# Patient Record
Sex: MALE | Race: BLACK OR AFRICAN AMERICAN | NOT HISPANIC OR LATINO
[De-identification: names, ages, dates, MRNs, and addresses within clinical notes are randomized per-mention and may not be internally consistent; named-entity substitution may affect disease eponyms.]

---

## 2020-12-26 ENCOUNTER — TRANSCRIPTION ENCOUNTER (OUTPATIENT)
Age: 45
End: 2020-12-26

## 2022-01-01 ENCOUNTER — OUTPATIENT (OUTPATIENT)
Dept: OUTPATIENT SERVICES | Facility: HOSPITAL | Age: 47
LOS: 1 days | End: 2022-01-01
Payer: COMMERCIAL

## 2022-01-01 DIAGNOSIS — Z20.828 CONTACT WITH AND (SUSPECTED) EXPOSURE TO OTHER VIRAL COMMUNICABLE DISEASES: ICD-10-CM

## 2022-01-01 PROCEDURE — U0005: CPT

## 2022-01-01 PROCEDURE — U0003: CPT

## 2022-01-08 ENCOUNTER — OUTPATIENT (OUTPATIENT)
Dept: OUTPATIENT SERVICES | Facility: HOSPITAL | Age: 47
LOS: 1 days | End: 2022-01-08
Payer: COMMERCIAL

## 2022-01-08 DIAGNOSIS — Z20.828 CONTACT WITH AND (SUSPECTED) EXPOSURE TO OTHER VIRAL COMMUNICABLE DISEASES: ICD-10-CM
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
## 2022-01-08 LAB — SARS-COV-2 RNA SPEC QL NAA+PROBE: SIGNIFICANT CHANGE UP

## 2022-01-08 PROCEDURE — U0005: CPT

## 2022-01-08 PROCEDURE — U0003: CPT

## 2022-12-23 ENCOUNTER — INPATIENT (INPATIENT)
Facility: HOSPITAL | Age: 47
LOS: 1 days | Discharge: HOME | DRG: 392 | End: 2022-12-25
Attending: STUDENT IN AN ORGANIZED HEALTH CARE EDUCATION/TRAINING PROGRAM | Admitting: STUDENT IN AN ORGANIZED HEALTH CARE EDUCATION/TRAINING PROGRAM
Payer: COMMERCIAL

## 2022-12-23 VITALS
HEART RATE: 136 BPM | OXYGEN SATURATION: 100 % | SYSTOLIC BLOOD PRESSURE: 87 MMHG | WEIGHT: 218.04 LBS | DIASTOLIC BLOOD PRESSURE: 55 MMHG | TEMPERATURE: 98 F | RESPIRATION RATE: 18 BRPM | HEIGHT: 70 IN

## 2022-12-23 DIAGNOSIS — K92.2 GASTROINTESTINAL HEMORRHAGE, UNSPECIFIED: ICD-10-CM

## 2022-12-23 DIAGNOSIS — K92.1 MELENA: ICD-10-CM

## 2022-12-23 DIAGNOSIS — R17 UNSPECIFIED JAUNDICE: ICD-10-CM

## 2022-12-23 LAB
ABO RH CONFIRMATION: SIGNIFICANT CHANGE UP
ALBUMIN SERPL ELPH-MCNC: 3.4 G/DL — SIGNIFICANT CHANGE UP (ref 3.3–5)
ALP SERPL-CCNC: 32 U/L — LOW (ref 40–120)
ALT FLD-CCNC: 35 U/L — SIGNIFICANT CHANGE UP (ref 10–45)
ANION GAP SERPL CALC-SCNC: 8 MMOL/L — SIGNIFICANT CHANGE UP (ref 5–17)
APPEARANCE UR: CLEAR — SIGNIFICANT CHANGE UP
AST SERPL-CCNC: 16 U/L — SIGNIFICANT CHANGE UP (ref 10–40)
BASOPHILS # BLD AUTO: 0.05 K/UL — SIGNIFICANT CHANGE UP (ref 0–0.2)
BASOPHILS NFR BLD AUTO: 0.4 % — SIGNIFICANT CHANGE UP (ref 0–2)
BILIRUB SERPL-MCNC: 1.4 MG/DL — HIGH (ref 0.2–1.2)
BILIRUB UR-MCNC: NEGATIVE — SIGNIFICANT CHANGE UP
BLD GP AB SCN SERPL QL: SIGNIFICANT CHANGE UP
BUN SERPL-MCNC: 38 MG/DL — HIGH (ref 7–23)
CALCIUM SERPL-MCNC: 8.6 MG/DL — SIGNIFICANT CHANGE UP (ref 8.4–10.5)
CHLORIDE SERPL-SCNC: 104 MMOL/L — SIGNIFICANT CHANGE UP (ref 96–108)
CO2 SERPL-SCNC: 27 MMOL/L — SIGNIFICANT CHANGE UP (ref 22–31)
COLOR SPEC: YELLOW — SIGNIFICANT CHANGE UP
CREAT SERPL-MCNC: 1.09 MG/DL — SIGNIFICANT CHANGE UP (ref 0.5–1.3)
DIFF PNL FLD: NEGATIVE — SIGNIFICANT CHANGE UP
EGFR: 84 ML/MIN/1.73M2 — SIGNIFICANT CHANGE UP
EOSINOPHIL # BLD AUTO: 0.05 K/UL — SIGNIFICANT CHANGE UP (ref 0–0.5)
EOSINOPHIL NFR BLD AUTO: 0.4 % — SIGNIFICANT CHANGE UP (ref 0–6)
FLUAV AG NPH QL: SIGNIFICANT CHANGE UP
FLUBV AG NPH QL: SIGNIFICANT CHANGE UP
GLUCOSE BLDC GLUCOMTR-MCNC: 304 MG/DL — HIGH (ref 70–99)
GLUCOSE SERPL-MCNC: 337 MG/DL — HIGH (ref 70–99)
GLUCOSE UR QL: 1000 MG/DL
HCT VFR BLD CALC: 25.9 % — LOW (ref 39–50)
HCT VFR BLD CALC: 28.6 % — LOW (ref 39–50)
HGB BLD-MCNC: 8.6 G/DL — LOW (ref 13–17)
HGB BLD-MCNC: 9.6 G/DL — LOW (ref 13–17)
IMM GRANULOCYTES NFR BLD AUTO: 0.5 % — SIGNIFICANT CHANGE UP (ref 0–0.9)
KETONES UR-MCNC: ABNORMAL
LEUKOCYTE ESTERASE UR-ACNC: NEGATIVE — SIGNIFICANT CHANGE UP
LIDOCAIN IGE QN: 122 U/L — SIGNIFICANT CHANGE UP (ref 73–393)
LYMPHOCYTES # BLD AUTO: 2.73 K/UL — SIGNIFICANT CHANGE UP (ref 1–3.3)
LYMPHOCYTES # BLD AUTO: 21.3 % — SIGNIFICANT CHANGE UP (ref 13–44)
MCHC RBC-ENTMCNC: 28 PG — SIGNIFICANT CHANGE UP (ref 27–34)
MCHC RBC-ENTMCNC: 33.6 GM/DL — SIGNIFICANT CHANGE UP (ref 32–36)
MCV RBC AUTO: 83.4 FL — SIGNIFICANT CHANGE UP (ref 80–100)
MONOCYTES # BLD AUTO: 0.63 K/UL — SIGNIFICANT CHANGE UP (ref 0–0.9)
MONOCYTES NFR BLD AUTO: 4.9 % — SIGNIFICANT CHANGE UP (ref 2–14)
NEUTROPHILS # BLD AUTO: 9.29 K/UL — HIGH (ref 1.8–7.4)
NEUTROPHILS NFR BLD AUTO: 72.5 % — SIGNIFICANT CHANGE UP (ref 43–77)
NITRITE UR-MCNC: NEGATIVE — SIGNIFICANT CHANGE UP
NRBC # BLD: 0 /100 WBCS — SIGNIFICANT CHANGE UP (ref 0–0)
OB PNL STL: POSITIVE
PH UR: 5 — SIGNIFICANT CHANGE UP (ref 5–8)
PLATELET # BLD AUTO: 242 K/UL — SIGNIFICANT CHANGE UP (ref 150–400)
POTASSIUM SERPL-MCNC: 4.4 MMOL/L — SIGNIFICANT CHANGE UP (ref 3.5–5.3)
POTASSIUM SERPL-SCNC: 4.4 MMOL/L — SIGNIFICANT CHANGE UP (ref 3.5–5.3)
PROT SERPL-MCNC: 5.7 G/DL — LOW (ref 6–8.3)
PROT UR-MCNC: NEGATIVE — SIGNIFICANT CHANGE UP
RBC # BLD: 3.43 M/UL — LOW (ref 4.2–5.8)
RBC # FLD: 14 % — SIGNIFICANT CHANGE UP (ref 10.3–14.5)
RSV RNA NPH QL NAA+NON-PROBE: SIGNIFICANT CHANGE UP
SARS-COV-2 RNA SPEC QL NAA+PROBE: SIGNIFICANT CHANGE UP
SODIUM SERPL-SCNC: 139 MMOL/L — SIGNIFICANT CHANGE UP (ref 135–145)
SP GR SPEC: 1.01 — SIGNIFICANT CHANGE UP (ref 1.01–1.02)
UROBILINOGEN FLD QL: NEGATIVE — SIGNIFICANT CHANGE UP
WBC # BLD: 12.82 K/UL — HIGH (ref 3.8–10.5)
WBC # FLD AUTO: 12.82 K/UL — HIGH (ref 3.8–10.5)

## 2022-12-23 PROCEDURE — 99223 1ST HOSP IP/OBS HIGH 75: CPT

## 2022-12-23 PROCEDURE — 99223 1ST HOSP IP/OBS HIGH 75: CPT | Mod: 25

## 2022-12-23 PROCEDURE — 71045 X-RAY EXAM CHEST 1 VIEW: CPT | Mod: 26

## 2022-12-23 PROCEDURE — 76705 ECHO EXAM OF ABDOMEN: CPT | Mod: 26

## 2022-12-23 PROCEDURE — 99285 EMERGENCY DEPT VISIT HI MDM: CPT

## 2022-12-23 PROCEDURE — 93010 ELECTROCARDIOGRAM REPORT: CPT

## 2022-12-23 PROCEDURE — 43239 EGD BIOPSY SINGLE/MULTIPLE: CPT | Mod: 59

## 2022-12-23 RX ORDER — SODIUM CHLORIDE 9 MG/ML
1000 INJECTION INTRAMUSCULAR; INTRAVENOUS; SUBCUTANEOUS
Refills: 0 | Status: DISCONTINUED | OUTPATIENT
Start: 2022-12-23 | End: 2022-12-25

## 2022-12-23 RX ORDER — SODIUM CHLORIDE 9 MG/ML
1000 INJECTION INTRAMUSCULAR; INTRAVENOUS; SUBCUTANEOUS ONCE
Refills: 0 | Status: COMPLETED | OUTPATIENT
Start: 2022-12-23 | End: 2022-12-23

## 2022-12-23 RX ORDER — GLUCAGON INJECTION, SOLUTION 0.5 MG/.1ML
1 INJECTION, SOLUTION SUBCUTANEOUS ONCE
Refills: 0 | Status: DISCONTINUED | OUTPATIENT
Start: 2022-12-23 | End: 2022-12-25

## 2022-12-23 RX ORDER — DEXTROSE 50 % IN WATER 50 %
15 SYRINGE (ML) INTRAVENOUS ONCE
Refills: 0 | Status: DISCONTINUED | OUTPATIENT
Start: 2022-12-23 | End: 2022-12-25

## 2022-12-23 RX ORDER — ONDANSETRON 8 MG/1
4 TABLET, FILM COATED ORAL EVERY 8 HOURS
Refills: 0 | Status: DISCONTINUED | OUTPATIENT
Start: 2022-12-23 | End: 2022-12-25

## 2022-12-23 RX ORDER — CEFTRIAXONE 500 MG/1
1000 INJECTION, POWDER, FOR SOLUTION INTRAMUSCULAR; INTRAVENOUS ONCE
Refills: 0 | Status: COMPLETED | OUTPATIENT
Start: 2022-12-23 | End: 2022-12-23

## 2022-12-23 RX ORDER — PANTOPRAZOLE SODIUM 20 MG/1
40 TABLET, DELAYED RELEASE ORAL
Refills: 0 | Status: DISCONTINUED | OUTPATIENT
Start: 2022-12-24 | End: 2022-12-23

## 2022-12-23 RX ORDER — SODIUM CHLORIDE 9 MG/ML
1000 INJECTION, SOLUTION INTRAVENOUS
Refills: 0 | Status: DISCONTINUED | OUTPATIENT
Start: 2022-12-23 | End: 2022-12-25

## 2022-12-23 RX ORDER — DEXTROSE 50 % IN WATER 50 %
12.5 SYRINGE (ML) INTRAVENOUS ONCE
Refills: 0 | Status: DISCONTINUED | OUTPATIENT
Start: 2022-12-23 | End: 2022-12-25

## 2022-12-23 RX ORDER — PANTOPRAZOLE SODIUM 20 MG/1
40 TABLET, DELAYED RELEASE ORAL ONCE
Refills: 0 | Status: COMPLETED | OUTPATIENT
Start: 2022-12-23 | End: 2022-12-23

## 2022-12-23 RX ORDER — ONDANSETRON 8 MG/1
4 TABLET, FILM COATED ORAL ONCE
Refills: 0 | Status: COMPLETED | OUTPATIENT
Start: 2022-12-23 | End: 2022-12-23

## 2022-12-23 RX ORDER — LANOLIN ALCOHOL/MO/W.PET/CERES
3 CREAM (GRAM) TOPICAL AT BEDTIME
Refills: 0 | Status: DISCONTINUED | OUTPATIENT
Start: 2022-12-23 | End: 2022-12-25

## 2022-12-23 RX ORDER — DEXTROSE 50 % IN WATER 50 %
25 SYRINGE (ML) INTRAVENOUS ONCE
Refills: 0 | Status: DISCONTINUED | OUTPATIENT
Start: 2022-12-23 | End: 2022-12-25

## 2022-12-23 RX ORDER — INSULIN LISPRO 100/ML
VIAL (ML) SUBCUTANEOUS AT BEDTIME
Refills: 0 | Status: DISCONTINUED | OUTPATIENT
Start: 2022-12-23 | End: 2022-12-23

## 2022-12-23 RX ORDER — INSULIN LISPRO 100/ML
VIAL (ML) SUBCUTANEOUS EVERY 6 HOURS
Refills: 0 | Status: DISCONTINUED | OUTPATIENT
Start: 2022-12-23 | End: 2022-12-25

## 2022-12-23 RX ORDER — ACETAMINOPHEN 500 MG
650 TABLET ORAL EVERY 6 HOURS
Refills: 0 | Status: DISCONTINUED | OUTPATIENT
Start: 2022-12-23 | End: 2022-12-25

## 2022-12-23 RX ORDER — INSULIN LISPRO 100/ML
VIAL (ML) SUBCUTANEOUS
Refills: 0 | Status: DISCONTINUED | OUTPATIENT
Start: 2022-12-23 | End: 2022-12-25

## 2022-12-23 RX ORDER — PANTOPRAZOLE SODIUM 20 MG/1
40 TABLET, DELAYED RELEASE ORAL EVERY 12 HOURS
Refills: 0 | Status: DISCONTINUED | OUTPATIENT
Start: 2022-12-24 | End: 2022-12-25

## 2022-12-23 RX ADMIN — ONDANSETRON 4 MILLIGRAM(S): 8 TABLET, FILM COATED ORAL at 16:13

## 2022-12-23 RX ADMIN — CEFTRIAXONE 100 MILLIGRAM(S): 500 INJECTION, POWDER, FOR SOLUTION INTRAMUSCULAR; INTRAVENOUS at 14:54

## 2022-12-23 RX ADMIN — Medication 8: at 21:43

## 2022-12-23 RX ADMIN — PANTOPRAZOLE SODIUM 40 MILLIGRAM(S): 20 TABLET, DELAYED RELEASE ORAL at 14:54

## 2022-12-23 RX ADMIN — SODIUM CHLORIDE 1000 MILLILITER(S): 9 INJECTION INTRAMUSCULAR; INTRAVENOUS; SUBCUTANEOUS at 14:54

## 2022-12-23 RX ADMIN — CEFTRIAXONE 1000 MILLIGRAM(S): 500 INJECTION, POWDER, FOR SOLUTION INTRAMUSCULAR; INTRAVENOUS at 15:37

## 2022-12-23 NOTE — PATIENT PROFILE ADULT - FUNCTIONAL ASSESSMENT - BASIC MOBILITY 6.
4-calculated by average/Not able to assess (calculate score using [facility] averaging method)

## 2022-12-23 NOTE — ED PROVIDER NOTE - PHYSICAL EXAMINATION
General:     NAD, well-nourished, well-appearing  Eyes: PERRL, pale conjunctiva  Head:     NC/AT, EOMI, dry oral mucosa  Neck:     trachea midline  Lungs:     CTA b/l  CVS:     RRR  Abd:     +BS, s/nt/nd  Ext:   no deformities   Neuro: AAOx3

## 2022-12-23 NOTE — PATIENT PROFILE ADULT - FUNCTIONAL ASSESSMENT - BASIC MOBILITY 5.
4 = No assist / stand by assistance Principal Discharge DX:	Left shoulder pain  Secondary Diagnosis:	HTN (hypertension)

## 2022-12-23 NOTE — PROVIDER CONTACT NOTE (OTHER) - ACTION/TREATMENT ORDERED:
Will hold off on transfusion pt as per Jaxson SMITH. Pt is aware denies any pain or discomfort and in no distress, will continue to monitor.

## 2022-12-23 NOTE — PROGRESS NOTE ADULT - ASSESSMENT
PLAN:    -Hb 8.6 tonight, would repeat in AM or sooner if 1) melena again 2) vitals become unstable  -tranfuse if Hb <7  -NPO tonight  -BID protonix  -SCDs  -AM labs

## 2022-12-23 NOTE — CONSULT NOTE ADULT - SUBJECTIVE AND OBJECTIVE BOX
INTERVAL HPI/OVERNIGHT EVENTS:  HPI:  46 y/o male pmh DMT2, HLD who presents to ER with complaints of fatigue and dark stool. Patient noted fatigue starting on wednesday, found it difficult to go up the stairs. No nausea, vomiting, diarrhea, hematemesis, brbpr or coffee ground emesis. Denies fever/body aches or recent travel. Takes baby asa daily, no additional NSAIDs. No colonoscopy or upper endoscopy in the past. No unintentional weight loss or loss of appetite. Patient notes having physical done a few weeks ago with normal blood work.    MEDICATIONS  (STANDING):    MEDICATIONS  (PRN):      Allergies    No Known Allergies    Intolerances        PAST MEDICAL & SURGICAL HISTORY:  DM (diabetes mellitus)          REVIEW OF SYSTEMS: negative unless indicated in HPI    non smoker  social ETOH    PHYSICAL EXAM:   Vital Signs:  Vital Signs Last 24 Hrs  T(C): 36.4 (23 Dec 2022 14:34), Max: 36.4 (23 Dec 2022 14:34)  T(F): 97.5 (23 Dec 2022 14:34), Max: 97.5 (23 Dec 2022 14:34)  HR: 99 (23 Dec 2022 14:45) (99 - 136)  BP: 127/79 (23 Dec 2022 14:45) (87/55 - 127/79)  BP(mean): --  RR: 17 (23 Dec 2022 14:45) (17 - 18)  SpO2: 99% (23 Dec 2022 14:45) (99% - 100%)    Parameters below as of 23 Dec 2022 14:34  Patient On (Oxygen Delivery Method): room air      Daily Height in cm: 177.8 (23 Dec 2022 14:34)    Daily I&O's Summary      GENERAL:  Appears stated age  HEENT:  NC/AT,  conjunctivae clear and pink, sclera +icterus  CHEST:  Full & symmetric excursion, no increased effort, breath sounds clear  HEART:  Regular rhythm, S1, S2, no murmur  ABDOMEN:  Soft, non-tender, non-distended, normoactive bowel sounds  EXTEREMITIES:  no edema  SKIN:  No rash/warm/dry  NEURO:  Alert, oriented, no asterixis, no tremor, no encephalopathy      LABS:                        9.6    12.82 )-----------( 242      ( 23 Dec 2022 14:45 )             28.6     12-23    139  |  104  |  38<H>  ----------------------------<  337<H>  4.4   |  27  |  1.09    Ca    8.6      23 Dec 2022 14:45    TPro  5.7<L>  /  Alb  3.4  /  TBili  1.4<H>  /  DBili  x   /  AST  16  /  ALT  35  /  AlkPhos  32<L>  12-23        amylase   lipaseLipase, Serum: 122 U/L (12-23 @ 14:45)    RADIOLOGY & ADDITIONAL TESTS:

## 2022-12-23 NOTE — PATIENT PROFILE ADULT - NSPROHMDIABETMGMTSTRAT_GEN_A_NUR
activity/blood glucose testing/coping strategies/diet modification/exercise/medication therapy/weight management

## 2022-12-23 NOTE — H&P ADULT - HISTORY OF PRESENT ILLNESS
47 year old M PMH DM type 2 coming in for black stools for 1-2 days. Patient states Wednesday night he noted a black stool and felt dizzy but continued with evening, went to work the next day and had 1-2 more BM that were black and tarry. 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 Patient admits to dizziness and lightheadedness with ambulation and moving head in bed, but when laying still asymptomatic. Denies chest pain or sob at rest or with ambulation, denies having GI bleed in the past. 47 year old M PMH DM type 2 coming in for black stools for 1-2 days. Patient states Wednesday night he noted a black stool and felt dizzy but continued with evening, went to work the next day and had 1-2 more BM that were black and tarry. 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 Patient admits to dizziness and lightheadedness with ambulation and moving head in bed, but when laying still asymptomatic. Denies chest pain or sob at rest or with ambulation, denies having GI bleed in the past. Patient states he had blood work done outpatient a few weeks ago and was normal.    In the ED, T 97.5F< , BP 87/55, RR 18, SpO2 100% on RA. Labs showed WBC 12.82, H/H 9.6/28.6, FOBT positive, BUN/Cr 38/1.09, tbili 1.4, glucose 337.    EKG: PENDING  CXR: no acute pathology  US abdomen: No evidence for intrahepatic or extrahepatic biliary ductal dilatation. Hepatic steatosis. 47 year old M PMH DM type 2 coming in for black stools for 1-2 days. Patient states Wednesday night he noted a black stool and felt dizzy but continued with evening, went to work the next day and had 1-2 more BM that were black and tarry. 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 Patient admits to dizziness and lightheadedness with ambulation and moving head in bed, but when laying still asymptomatic. Denies chest pain or sob at rest or with ambulation, denies having GI bleed in the past. Patient states he had blood work done outpatient a few weeks ago and was normal. Patient also noted to have mildly yellow eyes, wife states that was not present before today.    In the ED, T 97.5F< , BP 87/55, RR 18, SpO2 100% on RA. Labs showed WBC 12.82, H/H 9.6/28.6, FOBT positive, BUN/Cr 38/1.09, tbili 1.4, glucose 337.    EKG: PENDING  CXR: no acute pathology  US abdomen: No evidence for intrahepatic or extrahepatic biliary ductal dilatation. Hepatic steatosis. 47 year old M PMH DM type 2 coming in for black stools for 1-2 days. Patient states Wednesday night he noted a black stool and felt dizzy but continued with evening, went to work the next day and had 1-2 more BM that were black and tarry. 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 Patient admits to dizziness and lightheadedness with ambulation and moving head in bed, but when laying still asymptomatic. Denies chest pain or sob at rest or with ambulation, denies having GI bleed in the past. Patient states he had blood work done outpatient a few weeks ago and was normal. Patient also noted to have mildly yellow eyes, wife states that was not present before today.    In the ED, T 97.5F< , BP 87/55, RR 18, SpO2 100% on RA. Labs showed WBC 12.82, H/H 9.6/28.6, FOBT positive, BUN/Cr 38/1.09, tbili 1.4, glucose 337. Received zofran 4mg IVP x1, rocephin x1, protonix 40mg IVP x2, NS bolus x1 in the ED.     EKG: PENDING  CXR: no acute pathology  US abdomen: No evidence for intrahepatic or extrahepatic biliary ductal dilatation. Hepatic steatosis.

## 2022-12-23 NOTE — PROVIDER CONTACT NOTE (OTHER) - SITUATION
Received pt from nurse with a hemoglobin at 9.6.Order for 1unit of BRBc AT 1612 in place not administer,Nurse was told to hold off. Blood work rechecked hemoglobin 8.6 PA made aware will hold off .

## 2022-12-23 NOTE — H&P ADULT - ASSESSMENT
47 year old M PMH DM type 2 coming in for black stools for 1-2 days admitted for GIB    #GIB  - admit to medicine  - likely upper GIB given symptoms  - patient is on asa 81mg daily for unknown reason, will stop and likely can hold on discharge  - Hb on admission 9.6 however symptomatic and discussed with GI, will give PRBC x1U today  - keep type and screen active  - consent form signed and in chart  - patient currently hemodynamically stable  - check H/H at 10pm and continue to monitor closely  - continue protonix 40mg IV BID  - GI consulted, Dr. Weaver- discussed with Tiara, patient to have EGD today. Will keep NPO and diet to be placed after by GI team    #elevated bilirubin  - tbili noted to be 1.4 on admission and patient with scleral icterus  - US abd with hepatic steatosis but no other pathology noted     #leukocytosis  - likely reactive   - patient does not appear toxic at this time, afebrile  - monitor off abx    #DM type 2  - on ozempic outpatient started about 6 months ago  - follow up HbA1C  - continue moderate dose ISS and check FS    #DVT ppx  - no chemical DVT ppx in setting of GI bleed  - encourage ambulation with assistance as patient dizzy    discussed with wife, Shari at bedside- all questions answered. 47 year old M PMH DM type 2 coming in for black stools for 1-2 days admitted for GIB    #GIB  - admit to medicine  - likely upper GIB given symptoms  - patient is on asa 81mg daily for unknown reason, will stop and likely can hold on discharge  - Hb on admission 9.6 however symptomatic and discussed with GI, will give PRBC x1U today  - keep type and screen active  - consent form signed and in chart  - patient currently hemodynamically stable  - check H/H at 10pm and continue to monitor closely  - continue protonix 40mg IV BID  - GI consulted, Dr. Weaver- discussed with Tiara, patient to have EGD today. Will keep NPO and diet to be placed after by GI team    #elevated bilirubin  - tbili noted to be 1.4 on admission and patient with scleral icterus  - US abd with hepatic steatosis but no other pathology noted   - follow up hepatic panel in the AM    #leukocytosis  - likely reactive   - patient does not appear toxic at this time, afebrile  - monitor off abx    #DM type 2  - on ozempic outpatient started about 6 months ago  - follow up HbA1C  - continue moderate dose ISS and check FS    #DVT ppx  - no chemical DVT ppx in setting of GI bleed  - encourage ambulation with assistance as patient dizzy    discussed with wife, Shari at bedside- all questions answered. 47 year old M PMH DM type 2 coming in for black stools for 1-2 days admitted for GIB    #GIB  - admit to medicine  - likely upper GIB given symptoms  - patient is on asa 81mg daily for unknown reason, will stop and likely can hold on discharge  - Hb on admission 9.6 however symptomatic and discussed with GI, will give PRBC x1U today  - keep type and screen active  - consent form signed and in chart  - patient currently hemodynamically stable  - check H/H at 10pm and continue to monitor closely  - continue protonix 40mg IV BID  - GI consulted, Dr. Weaver- discussed with Tiara, patient to have EGD today. Will keep NPO and diet to be placed after by GI team. Continue IVF    #elevated bilirubin  - tbili noted to be 1.4 on admission and patient with scleral icterus  - US abd with hepatic steatosis but no other pathology noted   - follow up hepatic panel in the AM    #leukocytosis  - likely reactive   - patient does not appear toxic at this time, afebrile  - monitor off abx    #DM type 2  - on ozempic outpatient started about 6 months ago  - follow up HbA1C  - continue moderate dose ISS and check FS    #DVT ppx  - no chemical DVT ppx in setting of GI bleed  - encourage ambulation with assistance as patient dizzy    discussed with wife, Shari at bedside- all questions answered. 47 year old M PMH DM type 2 coming in for black stools for 1-2 days admitted for GIB    #GIB  - admit to medicine  - likely upper GIB given symptoms  - patient is on asa 81mg daily for unknown reason, will stop and likely can hold on discharge  - Hb on admission 9.6 however symptomatic and discussed with GI, will give PRBC x1U today  - keep type and screen active  - consent form signed and in chart  - patient currently hemodynamically stable  - check H/H at 10pm and continue to monitor closely  - continue protonix 40mg IV BID  - GI consulted, Dr. Weaver- discussed with Tiara, patient to have EGD today. Will keep NPO and diet to be placed after by GI team. Continue IVF    addendum:  discussed with Dr. Weaver after EGD- not able to find anything specific, some food still in the stomach, no active bleeding. Will get CTA abd/pelvis to monitor for bleed. Keep NPO for now and IVF. Follow up in the AM    #elevated bilirubin  - tbili noted to be 1.4 on admission and patient with scleral icterus  - US abd with hepatic steatosis but no other pathology noted   - follow up hepatic panel in the AM    #leukocytosis  - likely reactive   - patient does not appear toxic at this time, afebrile  - monitor off abx    #DM type 2  - on ozempic outpatient started about 6 months ago  - follow up HbA1C  - continue moderate dose ISS and check FS    #DVT ppx  - no chemical DVT ppx in setting of GI bleed  - encourage ambulation with assistance as patient dizzy    discussed with wife, Shari at bedside- all questions answered.

## 2022-12-23 NOTE — PROGRESS NOTE ADULT - SUBJECTIVE AND OBJECTIVE BOX
F/U Note:    47y Male admitted with black/tarry stools, and Hb 9.6    Interval Hx:  per report had EGD today, however was inadequate as there was food in his stomach  -patient to have another attempt made tomorrow    Vital Signs Last 24 Hrs  T(C): 36.9 (23 Dec 2022 18:32), Max: 36.9 (23 Dec 2022 18:32)  T(F): 98.4 (23 Dec 2022 18:32), Max: 98.4 (23 Dec 2022 18:32)  HR: 91 (23 Dec 2022 18:32) (84 - 136)  BP: 124/63 (23 Dec 2022 18:32) (87/55 - 127/79)  BP(mean): 78 (23 Dec 2022 15:07) (78 - 78)  RR: 18 (23 Dec 2022 18:32) (16 - 18)  SpO2: 99% (23 Dec 2022 18:32) (99% - 100%)    Parameters below as of 23 Dec 2022 18:32  Patient On (Oxygen Delivery Method): room air                                8.6    x     )-----------( x        ( 23 Dec 2022 20:15 )             25.9         12-23    139  |  104  |  38<H>  ----------------------------<  337<H>  4.4   |  27  |  1.09    Ca    8.6      23 Dec 2022 14:45    TPro  5.7<L>  /  Alb  3.4  /  TBili  1.4<H>  /  DBili  x   /  AST  16  /  ALT  35  /  AlkPhos  32<L>  12-23        NEURO: no headaches, blurry vision, tremors, depression, anxity  CV: no chest pain, palpitations, murmurs, orthopnea  Resp: no shortness of breath, cough, wheeze, sputum production  GI: no stomach pain,nausea, vomitting, flatulence, hematemesis, hematochezia  PV: no swelling of extremities, no hair loss, no coolness to extremities  ENDO: no polydypsia, polyphagia, polyuria, weight loss, night sweats          NEURO: awake and alert  CV: (+) S1/S2, rrr, no mrg  RESP: CTA b/l  GI: soft, non tender

## 2022-12-23 NOTE — CONSULT NOTE ADULT - PROBLEM SELECTOR RECOMMENDATION 9
Monitor stool  Monitor h/h  Keep active type and screen  NPO  PPI IV BID  Upper endoscopy today  Continue IV fluids

## 2022-12-23 NOTE — ED PROVIDER NOTE - CLINICAL SUMMARY MEDICAL DECISION MAKING FREE TEXT BOX
pt 47y Male history of type 2 diabetes takes Ozempic for over 6 months now monthly.  Complaining of black stool since yesterday morning and dizziness x3 days to help.  Denies use of aspirin but takes lower dose aspirin daily last taken at 10:30 AM.  No history of gastritis or GI bleeds.  Denies diarrhea, fever, nausea, vomiting, chest pain, shortness of breath  pale conjunctiva. no distress. tachy and hypotensive but otherwise well appearing. will give ivf, protonix, and abx and wait for type and screen to transfuse as pt is stable

## 2022-12-23 NOTE — H&P ADULT - NSHPPHYSICALEXAM_GEN_ALL_CORE
T(C): 36.4 (12-23-22 @ 14:34), Max: 36.4 (12-23-22 @ 14:34)  HR: 84 (12-23-22 @ 16:08) (84 - 136)  BP: 104/64 (12-23-22 @ 16:08) (87/55 - 127/79)  RR: 16 (12-23-22 @ 16:08) (16 - 18)  SpO2: 100% (12-23-22 @ 16:08) (99% - 100%)    GENERAL: patient appears well, no acute distress, appropriate, pleasant  EYES: sclera icterus present but mild, no exudates  ENMT: oropharynx clear without erythema, no exudates, moist mucous membranes  NECK: supple, soft, no thyromegaly noted  LUNGS: good air entry bilaterally, clear to auscultation, symmetric breath sounds, no wheezing or rhonchi appreciated  HEART: soft S1/S2, regular rate and rhythm, no murmurs noted, no lower extremity edema  GASTROINTESTINAL: abdomen is soft, nontender, nondistended, normoactive bowel sounds, no palpable masses, negative nuñez sign  INTEGUMENT: good skin turgor, no lesions noted  MUSCULOSKELETAL: no clubbing or cyanosis, no obvious deformity  NEUROLOGIC: awake, alert, oriented x3, good muscle tone in 4 extremities, no obvious sensory deficits  PSYCHIATRIC: mood is good, affect is congruent, linear and logical thought process  HEME/LYMPH: no palpable supraclavicular nodules, no obvious ecchymosis or petechiae

## 2022-12-23 NOTE — ED PROVIDER NOTE - OBJECTIVE STATEMENT
pt 47y Male history of type 2 diabetes takes Ozempic for over 6 months now monthly.  Complaining of black stool since yesterday morning and dizziness x3 days to help.  Denies use of aspirin but takes lower dose aspirin daily last taken at 10:30 AM.  No history of gastritis or GI bleeds.  Denies diarrhea, fever, nausea, vomiting, chest pain, shortness of breath

## 2022-12-23 NOTE — CONSULT NOTE ADULT - ASSESSMENT
46 y/o male pmh DMT2, HLD who presents to ER with complaints of fatigue and dark stool. Patient noted fatigue starting on wednesday, found it difficult to go up the stairs. No nausea, vomiting, diarrhea, hematemesis, brbpr or coffee ground emesis. Denies fever/body aches or recent travel. Takes baby asa daily, no additional NSAIDs. No colonoscopy or upper endoscopy in the past. No unintentional weight loss or loss of appetite. Patient notes having physical done a few weeks ago with normal blood work.

## 2022-12-23 NOTE — ED PROVIDER NOTE - ATTENDING APP SHARED VISIT CONTRIBUTION OF CARE
I, Brendan Dias, DO personally saw the patient with JOSE.  I have personally performed a face to face diagnostic evaluation on this patient.  I have reviewed the JOSE note and agree with the history, exam, and plan of care, except as noted.  I personally saw the patient and performed a substantive portion of the visit including all aspects of the medical decision making.

## 2022-12-23 NOTE — ED ADULT NURSE REASSESSMENT NOTE - NS ED NURSE REASSESS COMMENT FT1
Blood bank called for Type and Cross 1 Unit per Dr. De La Torre. Consent obtained. RN witnessed consent.

## 2022-12-23 NOTE — ED ADULT NURSE NOTE - OBJECTIVE STATEMENT
47 yr old male to ED for complaints of dizziness. Patient states he has black stool and dizziness x 3 days. Patient denies chest pain, nausea, vomiting, shortness of breath.

## 2022-12-23 NOTE — H&P ADULT - NSHPSOCIALHISTORY_GEN_ALL_CORE
Lives at home with wife and 3 kids and dog  Denies illicit drug use, smoking  Occasional EtOH  Works as podiatrist

## 2022-12-23 NOTE — H&P ADULT - NSHPREVIEWOFSYSTEMS_GEN_ALL_CORE
CONSTITUTIONAL: denies fever, chills, fatigue, weakness  HEENT: denies blurred vision, sore throat  SKIN: denies new lesions, rash  CARDIOVASCULAR: denies chest pain, chest pressure, palpitations  RESPIRATORY: denies shortness of breath, sputum production  GASTROINTESTINAL: admits to black stools, denies nausea, vomiting, diarrhea, abdominal pain  GENITOURINARY: denies dysuria, discharge  NEUROLOGICAL: denies numbness, headache, focal weakness, admits to dizziness  MUSCULOSKELETAL: denies new joint pain, muscle aches  HEMATOLOGIC: denies gross bleeding, bruising  LYMPHATICS: denies enlarged lymph nodes, extremity swelling  PSYCHIATRIC: denies recent changes in anxiety, depression  ENDOCRINOLOGIC: denies sweating, cold or heat intolerance

## 2022-12-24 ENCOUNTER — TRANSCRIPTION ENCOUNTER (OUTPATIENT)
Age: 47
End: 2022-12-24

## 2022-12-24 LAB
A1C WITH ESTIMATED AVERAGE GLUCOSE RESULT: 8.9 % — HIGH (ref 4–5.6)
ALBUMIN SERPL ELPH-MCNC: 3.3 G/DL — SIGNIFICANT CHANGE UP (ref 3.3–5)
ALP SERPL-CCNC: 29 U/L — LOW (ref 40–120)
ALT FLD-CCNC: 34 U/L — SIGNIFICANT CHANGE UP (ref 10–45)
ANION GAP SERPL CALC-SCNC: 6 MMOL/L — SIGNIFICANT CHANGE UP (ref 5–17)
AST SERPL-CCNC: 17 U/L — SIGNIFICANT CHANGE UP (ref 10–40)
BASOPHILS # BLD AUTO: 0.03 K/UL — SIGNIFICANT CHANGE UP (ref 0–0.2)
BASOPHILS NFR BLD AUTO: 0.2 % — SIGNIFICANT CHANGE UP (ref 0–2)
BILIRUB DIRECT SERPL-MCNC: 0.2 MG/DL — SIGNIFICANT CHANGE UP (ref 0–0.3)
BILIRUB INDIRECT FLD-MCNC: 0.8 MG/DL — SIGNIFICANT CHANGE UP (ref 0.2–1)
BILIRUB SERPL-MCNC: 1 MG/DL — SIGNIFICANT CHANGE UP (ref 0.2–1.2)
BUN SERPL-MCNC: 32 MG/DL — HIGH (ref 7–23)
CALCIUM SERPL-MCNC: 8.2 MG/DL — LOW (ref 8.4–10.5)
CHLORIDE SERPL-SCNC: 108 MMOL/L — SIGNIFICANT CHANGE UP (ref 96–108)
CO2 SERPL-SCNC: 30 MMOL/L — SIGNIFICANT CHANGE UP (ref 22–31)
CREAT SERPL-MCNC: 0.98 MG/DL — SIGNIFICANT CHANGE UP (ref 0.5–1.3)
EGFR: 95 ML/MIN/1.73M2 — SIGNIFICANT CHANGE UP
EOSINOPHIL # BLD AUTO: 0 K/UL — SIGNIFICANT CHANGE UP (ref 0–0.5)
EOSINOPHIL NFR BLD AUTO: 0 % — SIGNIFICANT CHANGE UP (ref 0–6)
ESTIMATED AVERAGE GLUCOSE: 209 MG/DL — HIGH (ref 68–114)
GLUCOSE BLDC GLUCOMTR-MCNC: 158 MG/DL — HIGH (ref 70–99)
GLUCOSE BLDC GLUCOMTR-MCNC: 182 MG/DL — HIGH (ref 70–99)
GLUCOSE BLDC GLUCOMTR-MCNC: 186 MG/DL — HIGH (ref 70–99)
GLUCOSE BLDC GLUCOMTR-MCNC: 215 MG/DL — HIGH (ref 70–99)
GLUCOSE SERPL-MCNC: 239 MG/DL — HIGH (ref 70–99)
HCT VFR BLD CALC: 24.6 % — LOW (ref 39–50)
HCT VFR BLD CALC: 25.3 % — LOW (ref 39–50)
HCT VFR BLD CALC: 26 % — LOW (ref 39–50)
HGB BLD-MCNC: 8.1 G/DL — LOW (ref 13–17)
HGB BLD-MCNC: 8.4 G/DL — LOW (ref 13–17)
HGB BLD-MCNC: 8.8 G/DL — LOW (ref 13–17)
IMM GRANULOCYTES NFR BLD AUTO: 0.5 % — SIGNIFICANT CHANGE UP (ref 0–0.9)
LYMPHOCYTES # BLD AUTO: 1.7 K/UL — SIGNIFICANT CHANGE UP (ref 1–3.3)
LYMPHOCYTES # BLD AUTO: 13.8 % — SIGNIFICANT CHANGE UP (ref 13–44)
MCHC RBC-ENTMCNC: 27.8 PG — SIGNIFICANT CHANGE UP (ref 27–34)
MCHC RBC-ENTMCNC: 28.1 PG — SIGNIFICANT CHANGE UP (ref 27–34)
MCHC RBC-ENTMCNC: 28.3 PG — SIGNIFICANT CHANGE UP (ref 27–34)
MCHC RBC-ENTMCNC: 32.9 GM/DL — SIGNIFICANT CHANGE UP (ref 32–36)
MCHC RBC-ENTMCNC: 33.2 GM/DL — SIGNIFICANT CHANGE UP (ref 32–36)
MCHC RBC-ENTMCNC: 33.8 GM/DL — SIGNIFICANT CHANGE UP (ref 32–36)
MCV RBC AUTO: 83.1 FL — SIGNIFICANT CHANGE UP (ref 80–100)
MCV RBC AUTO: 83.8 FL — SIGNIFICANT CHANGE UP (ref 80–100)
MCV RBC AUTO: 86 FL — SIGNIFICANT CHANGE UP (ref 80–100)
MONOCYTES # BLD AUTO: 0.76 K/UL — SIGNIFICANT CHANGE UP (ref 0–0.9)
MONOCYTES NFR BLD AUTO: 6.1 % — SIGNIFICANT CHANGE UP (ref 2–14)
NEUTROPHILS # BLD AUTO: 9.81 K/UL — HIGH (ref 1.8–7.4)
NEUTROPHILS NFR BLD AUTO: 79.4 % — HIGH (ref 43–77)
NRBC # BLD: 0 /100 WBCS — SIGNIFICANT CHANGE UP (ref 0–0)
PLATELET # BLD AUTO: 197 K/UL — SIGNIFICANT CHANGE UP (ref 150–400)
PLATELET # BLD AUTO: 201 K/UL — SIGNIFICANT CHANGE UP (ref 150–400)
PLATELET # BLD AUTO: 209 K/UL — SIGNIFICANT CHANGE UP (ref 150–400)
POTASSIUM SERPL-MCNC: 4.2 MMOL/L — SIGNIFICANT CHANGE UP (ref 3.5–5.3)
POTASSIUM SERPL-SCNC: 4.2 MMOL/L — SIGNIFICANT CHANGE UP (ref 3.5–5.3)
PROT SERPL-MCNC: 5.6 G/DL — LOW (ref 6–8.3)
RBC # BLD: 2.86 M/UL — LOW (ref 4.2–5.8)
RBC # BLD: 3.02 M/UL — LOW (ref 4.2–5.8)
RBC # BLD: 3.13 M/UL — LOW (ref 4.2–5.8)
RBC # FLD: 14 % — SIGNIFICANT CHANGE UP (ref 10.3–14.5)
RBC # FLD: 14.2 % — SIGNIFICANT CHANGE UP (ref 10.3–14.5)
RBC # FLD: 14.3 % — SIGNIFICANT CHANGE UP (ref 10.3–14.5)
SODIUM SERPL-SCNC: 144 MMOL/L — SIGNIFICANT CHANGE UP (ref 135–145)
WBC # BLD: 12 K/UL — HIGH (ref 3.8–10.5)
WBC # BLD: 12.36 K/UL — HIGH (ref 3.8–10.5)
WBC # BLD: 13.17 K/UL — HIGH (ref 3.8–10.5)
WBC # FLD AUTO: 12 K/UL — HIGH (ref 3.8–10.5)
WBC # FLD AUTO: 12.36 K/UL — HIGH (ref 3.8–10.5)
WBC # FLD AUTO: 13.17 K/UL — HIGH (ref 3.8–10.5)

## 2022-12-24 PROCEDURE — 99233 SBSQ HOSP IP/OBS HIGH 50: CPT

## 2022-12-24 PROCEDURE — 74174 CTA ABD&PLVS W/CONTRAST: CPT | Mod: 26

## 2022-12-24 RX ORDER — SOD SULF/SODIUM/NAHCO3/KCL/PEG
1 SOLUTION, RECONSTITUTED, ORAL ORAL ONCE
Refills: 0 | Status: DISCONTINUED | OUTPATIENT
Start: 2022-12-24 | End: 2022-12-25

## 2022-12-24 RX ORDER — SENNA PLUS 8.6 MG/1
2 TABLET ORAL ONCE
Refills: 0 | Status: COMPLETED | OUTPATIENT
Start: 2022-12-24 | End: 2022-12-24

## 2022-12-24 RX ADMIN — Medication 2: at 17:34

## 2022-12-24 RX ADMIN — Medication 4: at 07:59

## 2022-12-24 RX ADMIN — SODIUM CHLORIDE 75 MILLILITER(S): 9 INJECTION INTRAMUSCULAR; INTRAVENOUS; SUBCUTANEOUS at 02:00

## 2022-12-24 RX ADMIN — PANTOPRAZOLE SODIUM 40 MILLIGRAM(S): 20 TABLET, DELAYED RELEASE ORAL at 17:35

## 2022-12-24 RX ADMIN — Medication 2: at 11:45

## 2022-12-24 RX ADMIN — PANTOPRAZOLE SODIUM 40 MILLIGRAM(S): 20 TABLET, DELAYED RELEASE ORAL at 06:38

## 2022-12-24 RX ADMIN — SENNA PLUS 2 TABLET(S): 8.6 TABLET ORAL at 21:14

## 2022-12-24 NOTE — DISCHARGE NOTE PROVIDER - NSDCMRMEDTOKEN_GEN_ALL_CORE_FT
Aspir 81 oral delayed release tablet: 1 tab(s) orally once a day  Ozempic (1 mg dose) 4 mg/3 mL subcutaneous solution: 0.75 milliliter(s) subcutaneous once a week on Thursday   Ozempic (1 mg dose) 4 mg/3 mL subcutaneous solution: 0.75 milliliter(s) subcutaneous once a week on Thursday  pantoprazole 40 mg intravenous injection: 40 milligram(s) intravenous every 12 hours   Ozempic (1 mg dose) 4 mg/3 mL subcutaneous solution: 0.75 milliliter(s) subcutaneous once a week on Thursday  Protonix 40 mg oral granule, delayed release: 1 each orally once a day

## 2022-12-24 NOTE — DISCHARGE NOTE PROVIDER - PROVIDER TOKENS
PROVIDER:[TOKEN:[[id number]],FOLLOWUP:[1 week]] PROVIDER:[TOKEN:[[id number]],FOLLOWUP:[1 week]],PROVIDER:[TOKEN:[[id number]]]

## 2022-12-24 NOTE — DISCHARGE NOTE PROVIDER - NSDCCPCAREPLAN_GEN_ALL_CORE_FT
PRINCIPAL DISCHARGE DIAGNOSIS  Diagnosis: Upper GI bleed  Assessment and Plan of Treatment: pt with guiac positive stool    Pt received 1 unit PRBCs for low H/H   Pt underwnt colonoscopy - poor clean out needs repeating   Pt to follow up with PMD for DM2 follow up  Follow up with Dr Meg Archer for further work up       PRINCIPAL DISCHARGE DIAGNOSIS  Diagnosis: Upper GI bleed  Assessment and Plan of Treatment: pt with guiac positive stool    Pt received 1 unit PRBCs for low H/H   Pt underwnt colonoscopy - poor clean out needs repeating   Pt to follow up with PMD for DM2 follow up. Please stop aspirin  Follow up with Dr Meg Archer for continued care

## 2022-12-24 NOTE — PROGRESS NOTE ADULT - SUBJECTIVE AND OBJECTIVE BOX
INTERVAL HPI/OVERNIGHT EVENTS:    Pt with no BM, no longer light headed.  S/P egd with apparent upper source, indeterminant for etiology.  HPI:  46 y/o male pmh DMT2, HLD who presents to ER with complaints of fatigue and dark stool. Patient noted fatigue starting on wednesday, found it difficult to go up the stairs. No nausea, vomiting, diarrhea, hematemesis, brbpr or coffee ground emesis. Denies fever/body aches or recent travel. Takes baby asa daily, no additional NSAIDs. No colonoscopy or upper endoscopy in the past. No unintentional weight loss or loss of appetite. Patient notes having physical done a few weeks ago with normal blood work.    MEDICATIONS  (STANDING):    MEDICATIONS  (PRN):      Allergies    No Known Allergies    Intolerances        PAST MEDICAL & SURGICAL HISTORY:  DM (diabetes mellitus)          REVIEW OF SYSTEMS: negative unless indicated in HPI    non smoker  social ETOH  ICU Vital Signs Last 24 Hrs  T(C): 36.7 (24 Dec 2022 13:00), Max: 37 (24 Dec 2022 09:00)  T(F): 98 (24 Dec 2022 13:00), Max: 98.6 (24 Dec 2022 09:00)  HR: 71 (24 Dec 2022 13:00) (68 - 136)  BP: 113/61 (24 Dec 2022 13:00) (87/55 - 139/70)  BP(mean): 78 (23 Dec 2022 15:07) (78 - 78)  ABP: --  ABP(mean): --  RR: 16 (24 Dec 2022 13:00) (16 - 18)  SpO2: 100% (24 Dec 2022 13:00) (98% - 100%)    O2 Parameters below as of 24 Dec 2022 13:00  Patient On (Oxygen Delivery Method): room air          PHYSICAL EXAM:   Vital Signs:  Vital Signs Last 24 Hrs  T(C): 36.4 (23 Dec 2022 14:34), Max: 36.4 (23 Dec 2022 14:34)  T(F): 97.5 (23 Dec 2022 14:34), Max: 97.5 (23 Dec 2022 14:34)  HR: 99 (23 Dec 2022 14:45) (99 - 136)  BP: 127/79 (23 Dec 2022 14:45) (87/55 - 127/79)  BP(mean): --  RR: 17 (23 Dec 2022 14:45) (17 - 18)  SpO2: 99% (23 Dec 2022 14:45) (99% - 100%)    Parameters below as of 23 Dec 2022 14:34  Patient On (Oxygen Delivery Method): room air      Daily Height in cm: 177.8 (23 Dec 2022 14:34)    Daily I&O's Summary      GENERAL:  Appears stated age  HEENT:  NC/AT,  conjunctivae clear and pink, sclera +icterus  CHEST:  Full & symmetric excursion, no increased effort, breath sounds clear  HEART:  Regular rhythm, S1, S2, no murmur  ABDOMEN:  Soft, non-tender, non-distended, normoactive bowel sounds  EXTEREMITIES:  no edema  SKIN:  No rash/warm/dry  NEURO:  Alert, oriented, no asterixis, no tremor, no encephalopathy      LABS:                                   8.1    12.36 )-----------( 197      ( 24 Dec 2022 06:45 )             24.6   12-24    144  |  108  |  32<H>  ----------------------------<  239<H>  4.2   |  30  |  0.98    Ca    8.2<L>      24 Dec 2022 06:45    TPro  5.6<L>  /  Alb  3.3  /  TBili  1.0  /  DBili  0.2  /  AST  17  /  ALT  34  /  AlkPhos  29<L>  12-24        RADIOLOGY & ADDITIONAL TESTS:    CT pending

## 2022-12-24 NOTE — PROGRESS NOTE ADULT - ASSESSMENT
47 year old M PMH DM type 2 coming in for black stools for 1-2 days admitted for GIB.    *Melena:  *Acute blood loss anemia:  *GIB:  Guiac positive   Hgb 8.1 this AM  1 iu PRBC this AM  Maintain active T+S  s/p EGD yesterday--still with retained food  PPI BID  GI following  Maintain 2 PIVs at all time   Advance diet to clears today    *Leukocytosis:  suspect reactive     *DM II:  HgbA1c 8.5  DIabetes educator appreciated  Monitor FS--cont ISS     DVT ppx: Venodynes     *Case d.w Dr Weaver  *Patient will update familuy    DIspo: pending clinical improvement    47 year old M PMH DM type 2 coming in for black stools for 1-2 days admitted for GIB.    *Melena:  *Acute blood loss anemia:  *GIB:  Guiac positive   Hgb 8.1 this AM  1 iu PRBC this AM  Maintain active T+S  s/p EGD yesterday--still with retained food--needs repeat  PPI BID  GI following  Maintain 2 PIVs at all time   Advance diet to clears today  CTAP with mass at GE junction--plan for EGD/EUS--outpatient vs inpatient depending on hemodynamic stability     *Leukocytosis:  suspect reactive     *DM II:  HgbA1c 8.5  DIabetes educator appreciated  Monitor FS--cont ISS     DVT ppx: Venodynes     *Case d.w Dr Weaver  *[facility]    DIspo: pending clinical improvement . Suspect home tomorrow if Hgb remains stable with close outpatient follow up

## 2022-12-24 NOTE — PROGRESS NOTE ADULT - ASSESSMENT
46 y/o male pmh DMT2, HLD who presents to ER with complaints of fatigue and dark stool. Patient noted fatigue starting on wednesday, found it difficult to go up the stairs. No nausea, vomiting, diarrhea, hematemesis, brbpr or coffee ground emesis. Denies fever/body aches or recent travel. Takes baby asa daily, no additional NSAIDs. No colonoscopy or upper endoscopy in the past. No unintentional weight loss or loss of appetite. Patient notes having physical done a few weeks ago with normal blood work.    Now stable post tz and egd demonstrating old blood but limited by food in stomach, therefore procedure terminated.    CT pending

## 2022-12-24 NOTE — DISCHARGE NOTE PROVIDER - CARE PROVIDERS DIRECT ADDRESSES
[email or web address]

## 2022-12-24 NOTE — PROGRESS NOTE ADULT - SUBJECTIVE AND OBJECTIVE BOX
Patient is a 47y old  Male who presents with a chief complaint of GIB (23 Dec 2022 19:37)  EGD yesterday--still with retained food in belly       Patient seen and examined at bedside.    ALLERGIES:  No Known Allergies    MEDICATIONS  (STANDING):  dextrose 5%. 1000 milliLiter(s) (100 mL/Hr) IV Continuous <Continuous>  dextrose 5%. 1000 milliLiter(s) (50 mL/Hr) IV Continuous <Continuous>  dextrose 50% Injectable 25 Gram(s) IV Push once  dextrose 50% Injectable 12.5 Gram(s) IV Push once  dextrose 50% Injectable 25 Gram(s) IV Push once  glucagon  Injectable 1 milliGRAM(s) IntraMuscular once  insulin lispro (ADMELOG) corrective regimen sliding scale   SubCutaneous every 6 hours  insulin lispro (ADMELOG) corrective regimen sliding scale   SubCutaneous three times a day before meals  pantoprazole  Injectable 40 milliGRAM(s) IV Push every 12 hours  sodium chloride 0.9%. 1000 milliLiter(s) (75 mL/Hr) IV Continuous <Continuous>    MEDICATIONS  (PRN):  acetaminophen     Tablet .. 650 milliGRAM(s) Oral every 6 hours PRN Temp greater or equal to 38C (100.4F), Mild Pain (1 - 3)  aluminum hydroxide/magnesium hydroxide/simethicone Suspension 30 milliLiter(s) Oral every 4 hours PRN Dyspepsia  dextrose Oral Gel 15 Gram(s) Oral once PRN Blood Glucose LESS THAN 70 milliGRAM(s)/deciliter  melatonin 3 milliGRAM(s) Oral at bedtime PRN Insomnia  ondansetron Injectable 4 milliGRAM(s) IV Push every 8 hours PRN Nausea and/or Vomiting    Vital Signs Last 24 Hrs  T(F): 98.6 (24 Dec 2022 09:00), Max: 98.6 (24 Dec 2022 09:00)  HR: 70 (24 Dec 2022 09:00) (68 - 136)  BP: 122/65 (24 Dec 2022 09:00) (87/55 - 127/79)  RR: 16 (24 Dec 2022 09:00) (16 - 18)  SpO2: 98% (24 Dec 2022 09:00) (98% - 100%)  I&O's Summary    23 Dec 2022 07:01  -  24 Dec 2022 07:00  --------------------------------------------------------  IN: 0 mL / OUT: 700 mL / NET: -700 mL      BMI (kg/m2): 33.2 (22 @ 18:32)  PHYSICAL EXAM:  General: NAD, A/O x 3  ENT: MMM, no thrush  Neck: Supple, No JVD  Lungs: Non labored breathing,  Clear to auscultation bilaterally,   Cardio: RRR, S1/S2, no pitting edema bilaterally  Abdomen: Soft, Nontender, Nondistended; Bowel sounds present  Extremities: No calf tenderness, moves all extremities    LABS:                        8.1    12.36 )-----------( 197      ( 24 Dec 2022 06:45 )             24.6           144  |  108  |  32  ----------------------------<  239  4.2   |  30  |  0.98    Ca    8.2      24 Dec 2022 06:45    TPro  5.6  /  Alb  3.3  /  TBili  1.0  /  DBili  0.2  /  AST  17  /  ALT  34  /  AlkPhos  29                                POCT Blood Glucose.: 215 mg/dL (24 Dec 2022 07:54)  POCT Blood Glucose.: 304 mg/dL (23 Dec 2022 21:39)      Urinalysis Basic - ( 23 Dec 2022 16:40 )    Color: Yellow / Appearance: Clear / S.015 / pH: x  Gluc: x / Ketone: Moderate  / Bili: Negative / Urobili: Negative   Blood: x / Protein: Negative / Nitrite: Negative   Leuk Esterase: Negative / RBC: x / WBC x   Sq Epi: x / Non Sq Epi: x / Bacteria: x            RADIOLOGY & ADDITIONAL TESTS:    Care Discussed with Consultants/Other Providers:    Patient is a 47y old  Male who presents with a chief complaint of GIB (23 Dec 2022 19:37)  EGD yesterday--still with retained food in belly       Patient seen and examined at bedside. Denies abdominal pain    ALLERGIES:  No Known Allergies    MEDICATIONS  (STANDING):  dextrose 5%. 1000 milliLiter(s) (100 mL/Hr) IV Continuous <Continuous>  dextrose 5%. 1000 milliLiter(s) (50 mL/Hr) IV Continuous <Continuous>  dextrose 50% Injectable 25 Gram(s) IV Push once  dextrose 50% Injectable 12.5 Gram(s) IV Push once  dextrose 50% Injectable 25 Gram(s) IV Push once  glucagon  Injectable 1 milliGRAM(s) IntraMuscular once  insulin lispro (ADMELOG) corrective regimen sliding scale   SubCutaneous every 6 hours  insulin lispro (ADMELOG) corrective regimen sliding scale   SubCutaneous three times a day before meals  pantoprazole  Injectable 40 milliGRAM(s) IV Push every 12 hours  sodium chloride 0.9%. 1000 milliLiter(s) (75 mL/Hr) IV Continuous <Continuous>    MEDICATIONS  (PRN):  acetaminophen     Tablet .. 650 milliGRAM(s) Oral every 6 hours PRN Temp greater or equal to 38C (100.4F), Mild Pain (1 - 3)  aluminum hydroxide/magnesium hydroxide/simethicone Suspension 30 milliLiter(s) Oral every 4 hours PRN Dyspepsia  dextrose Oral Gel 15 Gram(s) Oral once PRN Blood Glucose LESS THAN 70 milliGRAM(s)/deciliter  melatonin 3 milliGRAM(s) Oral at bedtime PRN Insomnia  ondansetron Injectable 4 milliGRAM(s) IV Push every 8 hours PRN Nausea and/or Vomiting    Vital Signs Last 24 Hrs  T(F): 98.6 (24 Dec 2022 09:00), Max: 98.6 (24 Dec 2022 09:00)  HR: 70 (24 Dec 2022 09:00) (68 - 136)  BP: 122/65 (24 Dec 2022 09:00) (87/55 - 127/79)  RR: 16 (24 Dec 2022 09:00) (16 - 18)  SpO2: 98% (24 Dec 2022 09:00) (98% - 100%)  I&O's Summary    23 Dec 2022 07:01  -  24 Dec 2022 07:00  --------------------------------------------------------  IN: 0 mL / OUT: 700 mL / NET: -700 mL      BMI (kg/m2): 33.2 (22 @ 18:32)  PHYSICAL EXAM:  General: NAD, A/O x 3  ENT: MMM, no thrush  Neck: Supple, No JVD  Lungs: Non labored breathing,  Clear to auscultation bilaterally,   Cardio: RRR, S1/S2, no pitting edema bilaterally  Abdomen: Soft, Nontender, Nondistended; Bowel sounds present  Extremities: No calf tenderness, moves all extremities    LABS:                        8.1    12.36 )-----------( 197      ( 24 Dec 2022 06:45 )             24.6           144  |  108  |  32  ----------------------------<  239  4.2   |  30  |  0.98    Ca    8.2      24 Dec 2022 06:45    TPro  5.6  /  Alb  3.3  /  TBili  1.0  /  DBili  0.2  /  AST  17  /  ALT  34  /  AlkPhos  29       POCT Blood Glucose.: 215 mg/dL (24 Dec 2022 07:54)  POCT Blood Glucose.: 304 mg/dL (23 Dec 2022 21:39)    Urinalysis Basic - ( 23 Dec 2022 16:40 )    Color: Yellow / Appearance: Clear / S.015 / pH: x  Gluc: x / Ketone: Moderate  / Bili: Negative / Urobili: Negative   Blood: x / Protein: Negative / Nitrite: Negative   Leuk Esterase: Negative / RBC: x / WBC x   Sq Epi: x / Non Sq Epi: x / Bacteria: x    RADIOLOGY & ADDITIONAL TESTS:    Care Discussed with Consultants/Other Providers: d/w Dr. Weaver

## 2022-12-24 NOTE — DISCHARGE NOTE PROVIDER - HOSPITAL COURSE
47 year old M PMH DM type 2 coming in for black stools for 1-2 days. Patient states Wednesday night he noted a black stool and felt dizzy but continued with evening, went to work the next day and had 1-2 more BM that were black and tarry. 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 Patient admits to dizziness and lightheadedness with ambulation and moving head in bed, but when laying still asymptomatic. Denies chest pain or sob at rest or with ambulation, denies having GI bleed in the past. Patient states he had blood work done outpatient a few weeks ago and was normal. Patient also noted to have mildly yellow eyes, wife states that was not present before day of admission,.    In the ED, T 97.5F< , BP 87/55, RR 18, SpO2 100% on RA. Labs showed WBC 12.82, H/H 9.6/28.6, FOBT positive, BUN/Cr 38/1.09, tbili 1.4, glucose 337. Received zofran 4mg IVP x1, rocephin x1, protonix 40mg IVP x2, NS bolus x1 in the ED.   CXR: no acute pathology.  US abdomen: No evidence for intrahepatic or extrahepatic biliary ductal dilatation. Hepatic steatosis. Admitted to hospitalist for further management . ASA stopped. Given 1 iu PRBC. PPI BID. GI consulted and performed EGD which revealed old blood and retained food in stomach.  CTAP revealed rounded density at GE junction measuring 3.5 cm, concerning for a gastric mass.   Hgb remained stable. Patient stable for dc with close outpatient follow up. He will need a repeat EGD and cscope      Discharging provider:        **RESULTS**  < from: CT Angio Abdomen and Pelvis w/ IV Cont (12.24.22 @ 13:44) >      IMPRESSION: No CT evidence for active extravasation of intravascular   contrast. There is a rounded density identified arising from the   posterior gastric wall adjacent to the GE junction measuring 3.5 cm   suspicious for gastric mass; further evaluation with upper endoscopy is   recommended.          --- End of Report ---    < end of copied text >       47 year old M PMH DM type 2 coming in for black stools for 1-2 days. Patient states Wednesday night he noted a black stool and felt dizzy but continued with evening, went to work the next day and had 1-2 more BM that were black and tarry. 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 Patient admits to dizziness and lightheadedness with ambulation and moving head in bed, but when laying still asymptomatic. Denies chest pain or sob at rest or with ambulation, denies having GI bleed in the past. Patient states he had blood work done outpatient a few weeks ago and was normal. Patient also noted to have mildly yellow eyes, wife states that was not present before day of admission,.    In the ED, T 97.5F< , BP 87/55, RR 18, SpO2 100% on RA. Labs showed WBC 12.82, H/H 9.6/28.6, FOBT positive, BUN/Cr 38/1.09, tbili 1.4, glucose 337. Received zofran 4mg IVP x1, rocephin x1, protonix 40mg IVP x2, NS bolus x1 in the ED.   CXR: no acute pathology.  US abdomen: No evidence for intrahepatic or extrahepatic biliary ductal dilatation. Hepatic steatosis. Admitted to hospitalist for further management . ASA stopped. Given 1 iu PRBC. PPI BID. GI consulted and performed EGD which revealed old blood and retained food in stomach.  CTAP revealed rounded density at GE junction measuring 3.5 cm, concerning for a gastric mass.   Hgb remained stable. Patient stable for dc with close outpatient follow up. He will need a repeat EGD and cscope.  Pt to follow up with Dr Weaver tuesday   and PMD for for DM - life style changes       Discharging provider:        **RESULTS**  < from: CT Angio Abdomen and Pelvis w/ IV Cont (12.24.22 @ 13:44) >      IMPRESSION: No CT evidence for active extravasation of intravascular   contrast. There is a rounded density identified arising from the   posterior gastric wall adjacent to the GE junction measuring 3.5 cm   suspicious for gastric mass; further evaluation with upper endoscopy is   recommended.          --- End of Report ---    < end of copied text >       47 year old M PMH DM type 2 coming in for black stools for 1-2 days. Patient states Wednesday night he noted a black stool and felt dizzy but continued with evening, went to work the next day and had 1-2 more BM that were black and tarry. 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 Patient admits to dizziness and lightheadedness with ambulation and moving head in bed, but when laying still asymptomatic. Denies chest pain or sob at rest or with ambulation, denies having GI bleed in the past. Patient states he had blood work done outpatient a few weeks ago and was normal. Patient also noted to have mildly yellow eyes, wife states that was not present before day of admission,.    In the ED, T 97.5F< , BP 87/55, RR 18, SpO2 100% on RA. Labs showed WBC 12.82, H/H 9.6/28.6, FOBT positive, BUN/Cr 38/1.09, tbili 1.4, glucose 337. Received zofran 4mg IVP x1, rocephin x1, protonix 40mg IVP x2, NS bolus x1 in the ED.   CXR: no acute pathology.  US abdomen: No evidence for intrahepatic or extrahepatic biliary ductal dilatation. Hepatic steatosis. Admitted to hospitalist for further management . ASA stopped. Given 1 iu PRBC. PPI BID. GI consulted and performed EGD which revealed old blood and retained food in stomach.  CTAP revealed rounded density at GE junction measuring 3.5 cm, concerning for a gastric mass.   Hgb remained stable. Patient stable for dc with close outpatient follow up. He will need a repeat EGD and cscope.  Pt to follow up with Dr Weaver tuesday   and PMD for for DM - life style changes       Discharging provider: Larry Cowan MD        **RESULTS**  < from: CT Angio Abdomen and Pelvis w/ IV Cont (12.24.22 @ 13:44) >      IMPRESSION: No CT evidence for active extravasation of intravascular   contrast. There is a rounded density identified arising from the   posterior gastric wall adjacent to the GE junction measuring 3.5 cm   suspicious for gastric mass; further evaluation with upper endoscopy is   recommended.          --- End of Report ---    < end of copied text >

## 2022-12-25 ENCOUNTER — TRANSCRIPTION ENCOUNTER (OUTPATIENT)
Age: 47
End: 2022-12-25

## 2022-12-25 VITALS
SYSTOLIC BLOOD PRESSURE: 125 MMHG | RESPIRATION RATE: 17 BRPM | DIASTOLIC BLOOD PRESSURE: 61 MMHG | HEART RATE: 65 BPM | TEMPERATURE: 98 F | OXYGEN SATURATION: 96 %

## 2022-12-25 LAB
ALBUMIN SERPL ELPH-MCNC: 3 G/DL — LOW (ref 3.3–5)
ALP SERPL-CCNC: 29 U/L — LOW (ref 40–120)
ALT FLD-CCNC: 27 U/L — SIGNIFICANT CHANGE UP (ref 10–45)
ANION GAP SERPL CALC-SCNC: 7 MMOL/L — SIGNIFICANT CHANGE UP (ref 5–17)
AST SERPL-CCNC: 13 U/L — SIGNIFICANT CHANGE UP (ref 10–40)
BILIRUB SERPL-MCNC: 1.2 MG/DL — SIGNIFICANT CHANGE UP (ref 0.2–1.2)
BUN SERPL-MCNC: 18 MG/DL — SIGNIFICANT CHANGE UP (ref 7–23)
CALCIUM SERPL-MCNC: 8 MG/DL — LOW (ref 8.4–10.5)
CHLORIDE SERPL-SCNC: 105 MMOL/L — SIGNIFICANT CHANGE UP (ref 96–108)
CO2 SERPL-SCNC: 27 MMOL/L — SIGNIFICANT CHANGE UP (ref 22–31)
CREAT SERPL-MCNC: 0.95 MG/DL — SIGNIFICANT CHANGE UP (ref 0.5–1.3)
CULTURE RESULTS: NO GROWTH — SIGNIFICANT CHANGE UP
EGFR: 99 ML/MIN/1.73M2 — SIGNIFICANT CHANGE UP
GLUCOSE BLDC GLUCOMTR-MCNC: 203 MG/DL — HIGH (ref 70–99)
GLUCOSE SERPL-MCNC: 169 MG/DL — HIGH (ref 70–99)
HCT VFR BLD CALC: 24.3 % — LOW (ref 39–50)
HGB BLD-MCNC: 8.2 G/DL — LOW (ref 13–17)
MCHC RBC-ENTMCNC: 28.6 PG — SIGNIFICANT CHANGE UP (ref 27–34)
MCHC RBC-ENTMCNC: 33.7 GM/DL — SIGNIFICANT CHANGE UP (ref 32–36)
MCV RBC AUTO: 84.7 FL — SIGNIFICANT CHANGE UP (ref 80–100)
NRBC # BLD: 0 /100 WBCS — SIGNIFICANT CHANGE UP (ref 0–0)
PLATELET # BLD AUTO: 195 K/UL — SIGNIFICANT CHANGE UP (ref 150–400)
POTASSIUM SERPL-MCNC: 4 MMOL/L — SIGNIFICANT CHANGE UP (ref 3.5–5.3)
POTASSIUM SERPL-SCNC: 4 MMOL/L — SIGNIFICANT CHANGE UP (ref 3.5–5.3)
PROT SERPL-MCNC: 5 G/DL — LOW (ref 6–8.3)
RBC # BLD: 2.87 M/UL — LOW (ref 4.2–5.8)
RBC # FLD: 14.2 % — SIGNIFICANT CHANGE UP (ref 10.3–14.5)
SARS-COV-2 RNA SPEC QL NAA+PROBE: SIGNIFICANT CHANGE UP
SODIUM SERPL-SCNC: 139 MMOL/L — SIGNIFICANT CHANGE UP (ref 135–145)
SPECIMEN SOURCE: SIGNIFICANT CHANGE UP
WBC # BLD: 9.14 K/UL — SIGNIFICANT CHANGE UP (ref 3.8–10.5)
WBC # FLD AUTO: 9.14 K/UL — SIGNIFICANT CHANGE UP (ref 3.8–10.5)

## 2022-12-25 PROCEDURE — 86850 RBC ANTIBODY SCREEN: CPT

## 2022-12-25 PROCEDURE — P9016: CPT

## 2022-12-25 PROCEDURE — 87086 URINE CULTURE/COLONY COUNT: CPT

## 2022-12-25 PROCEDURE — 82248 BILIRUBIN DIRECT: CPT

## 2022-12-25 PROCEDURE — 85027 COMPLETE CBC AUTOMATED: CPT

## 2022-12-25 PROCEDURE — 93005 ELECTROCARDIOGRAM TRACING: CPT

## 2022-12-25 PROCEDURE — 85025 COMPLETE CBC W/AUTO DIFF WBC: CPT

## 2022-12-25 PROCEDURE — 87637 SARSCOV2&INF A&B&RSV AMP PRB: CPT

## 2022-12-25 PROCEDURE — 83690 ASSAY OF LIPASE: CPT

## 2022-12-25 PROCEDURE — 96365 THER/PROPH/DIAG IV INF INIT: CPT

## 2022-12-25 PROCEDURE — 99239 HOSP IP/OBS DSCHRG MGMT >30: CPT

## 2022-12-25 PROCEDURE — 36430 TRANSFUSION BLD/BLD COMPNT: CPT

## 2022-12-25 PROCEDURE — 99233 SBSQ HOSP IP/OBS HIGH 50: CPT

## 2022-12-25 PROCEDURE — 86901 BLOOD TYPING SEROLOGIC RH(D): CPT

## 2022-12-25 PROCEDURE — 96375 TX/PRO/DX INJ NEW DRUG ADDON: CPT

## 2022-12-25 PROCEDURE — 74174 CTA ABD&PLVS W/CONTRAST: CPT

## 2022-12-25 PROCEDURE — 80053 COMPREHEN METABOLIC PANEL: CPT

## 2022-12-25 PROCEDURE — 76705 ECHO EXAM OF ABDOMEN: CPT

## 2022-12-25 PROCEDURE — 86923 COMPATIBILITY TEST ELECTRIC: CPT

## 2022-12-25 PROCEDURE — 99285 EMERGENCY DEPT VISIT HI MDM: CPT

## 2022-12-25 PROCEDURE — 86900 BLOOD TYPING SEROLOGIC ABO: CPT

## 2022-12-25 PROCEDURE — 82272 OCCULT BLD FECES 1-3 TESTS: CPT

## 2022-12-25 PROCEDURE — 36415 COLL VENOUS BLD VENIPUNCTURE: CPT

## 2022-12-25 PROCEDURE — 82962 GLUCOSE BLOOD TEST: CPT

## 2022-12-25 PROCEDURE — 87635 SARS-COV-2 COVID-19 AMP PRB: CPT

## 2022-12-25 PROCEDURE — 83036 HEMOGLOBIN GLYCOSYLATED A1C: CPT

## 2022-12-25 PROCEDURE — 85018 HEMOGLOBIN: CPT

## 2022-12-25 PROCEDURE — 71045 X-RAY EXAM CHEST 1 VIEW: CPT

## 2022-12-25 PROCEDURE — 85014 HEMATOCRIT: CPT

## 2022-12-25 RX ORDER — PANTOPRAZOLE SODIUM 20 MG/1
1 TABLET, DELAYED RELEASE ORAL
Qty: 30 | Refills: 0
Start: 2022-12-25 | End: 2023-01-23

## 2022-12-25 RX ORDER — PANTOPRAZOLE SODIUM 20 MG/1
40 TABLET, DELAYED RELEASE ORAL
Qty: 0 | Refills: 0 | DISCHARGE
Start: 2022-12-25

## 2022-12-25 RX ORDER — ASPIRIN/CALCIUM CARB/MAGNESIUM 324 MG
1 TABLET ORAL
Qty: 0 | Refills: 0 | DISCHARGE

## 2022-12-25 RX ORDER — PANTOPRAZOLE SODIUM 20 MG/1
40 TABLET, DELAYED RELEASE ORAL
Qty: 10 | Refills: 0
Start: 2022-12-25 | End: 2023-01-03

## 2022-12-25 RX ADMIN — Medication 4: at 08:55

## 2022-12-25 RX ADMIN — PANTOPRAZOLE SODIUM 40 MILLIGRAM(S): 20 TABLET, DELAYED RELEASE ORAL at 05:24

## 2022-12-25 RX ADMIN — Medication 650 MILLIGRAM(S): at 10:25

## 2022-12-25 RX ADMIN — Medication 650 MILLIGRAM(S): at 11:15

## 2022-12-25 NOTE — DISCHARGE NOTE NURSING/CASE MANAGEMENT/SOCIAL WORK - NSDCPEFALRISK_GEN_ALL_CORE
For information on Fall & Injury Prevention, visit: [email or web address]  [email or web address] OR  https://www.cdc.gov/steadi/patient.html

## 2022-12-25 NOTE — PROGRESS NOTE ADULT - SUBJECTIVE AND OBJECTIVE BOX
Patient is a 47y old  Male who presents with a chief complaint of GIB (24 Dec 2022 16:49)      Patient seen and examined at bedside.    ALLERGIES:  No Known Allergies    MEDICATIONS  (STANDING):  dextrose 5%. 1000 milliLiter(s) (100 mL/Hr) IV Continuous <Continuous>  dextrose 5%. 1000 milliLiter(s) (50 mL/Hr) IV Continuous <Continuous>  dextrose 50% Injectable 25 Gram(s) IV Push once  dextrose 50% Injectable 12.5 Gram(s) IV Push once  dextrose 50% Injectable 25 Gram(s) IV Push once  glucagon  Injectable 1 milliGRAM(s) IntraMuscular once  insulin lispro (ADMELOG) corrective regimen sliding scale   SubCutaneous every 6 hours  insulin lispro (ADMELOG) corrective regimen sliding scale   SubCutaneous three times a day before meals  pantoprazole  Injectable 40 milliGRAM(s) IV Push every 12 hours  polyethylene glycol/electrolyte Solution 1 milliLiter(s) Oral once  sodium chloride 0.9%. 1000 milliLiter(s) (75 mL/Hr) IV Continuous <Continuous>    MEDICATIONS  (PRN):  acetaminophen     Tablet .. 650 milliGRAM(s) Oral every 6 hours PRN Temp greater or equal to 38C (100.4F), Mild Pain (1 - 3)  aluminum hydroxide/magnesium hydroxide/simethicone Suspension 30 milliLiter(s) Oral every 4 hours PRN Dyspepsia  dextrose Oral Gel 15 Gram(s) Oral once PRN Blood Glucose LESS THAN 70 milliGRAM(s)/deciliter  melatonin 3 milliGRAM(s) Oral at bedtime PRN Insomnia  ondansetron Injectable 4 milliGRAM(s) IV Push every 8 hours PRN Nausea and/or Vomiting    Vital Signs Last 24 Hrs  T(F): 98.1 (25 Dec 2022 06:10), Max: 98.6 (24 Dec 2022 09:00)  HR: 60 (25 Dec 2022 06:10) (60 - 76)  BP: 109/57 (25 Dec 2022 06:10) (109/57 - 139/70)  RR: 18 (25 Dec 2022 06:10) (16 - 18)  SpO2: 98% (25 Dec 2022 06:10) (98% - 100%)  I&O's Summary    24 Dec 2022 07:01  -  25 Dec 2022 07:00  --------------------------------------------------------  IN: 240 mL / OUT: 600 mL / NET: -360 mL      PHYSICAL EXAM:  General: NAD, A/O x 3  ENT: MMM  Neck: Supple, No JVD  Lungs: Clear to auscultation bilaterally, Non labored breathing   Cardio: RRR, S1/S2, No murmurs  Abdomen: Soft, Nontender, Nondistended; Bowel sounds present  Extremities: No calf tenderness, No pitting edema    LABS:                        8.2    9.14  )-----------( 195      ( 25 Dec 2022 05:25 )             24.3         139  |  105  |  18  ----------------------------<  169  4.0   |  27  |  0.95    Ca    8.0      25 Dec 2022 05:25    TPro  5.0  /  Alb  3.0  /  TBili  1.2  /  DBili  x   /  AST  13  /  ALT  27  /  AlkPhos  29                              POCT Blood Glucose.: 182 mg/dL (24 Dec 2022 21:03)  POCT Blood Glucose.: 158 mg/dL (24 Dec 2022 17:32)  POCT Blood Glucose.: 186 mg/dL (24 Dec 2022 11:43)  POCT Blood Glucose.: 215 mg/dL (24 Dec 2022 07:54)      Urinalysis Basic - ( 23 Dec 2022 16:40 )    Color: Yellow / Appearance: Clear / S.015 / pH: x  Gluc: x / Ketone: Moderate  / Bili: Negative / Urobili: Negative   Blood: x / Protein: Negative / Nitrite: Negative   Leuk Esterase: Negative / RBC: x / WBC x   Sq Epi: x / Non Sq Epi: x / Bacteria: x          RADIOLOGY & ADDITIONAL TESTS:    Care Discussed with Consultants/Other Providers:    Patient is a 47y old  Male who presents with a chief complaint of GIB (24 Dec 2022 16:49)      Patient seen and examined at bedside.  Pt with c/o headache. Pt otherwise not having any symptoms of dizziness or lightheadness with ambulating or sitting .      ALLERGIES:  No Known Allergies    MEDICATIONS  (STANDING):  dextrose 5%. 1000 milliLiter(s) (100 mL/Hr) IV Continuous <Continuous>  dextrose 5%. 1000 milliLiter(s) (50 mL/Hr) IV Continuous <Continuous>  dextrose 50% Injectable 25 Gram(s) IV Push once  dextrose 50% Injectable 12.5 Gram(s) IV Push once  dextrose 50% Injectable 25 Gram(s) IV Push once  glucagon  Injectable 1 milliGRAM(s) IntraMuscular once  insulin lispro (ADMELOG) corrective regimen sliding scale   SubCutaneous every 6 hours  insulin lispro (ADMELOG) corrective regimen sliding scale   SubCutaneous three times a day before meals  pantoprazole  Injectable 40 milliGRAM(s) IV Push every 12 hours  polyethylene glycol/electrolyte Solution 1 milliLiter(s) Oral once  sodium chloride 0.9%. 1000 milliLiter(s) (75 mL/Hr) IV Continuous <Continuous>    MEDICATIONS  (PRN):  acetaminophen     Tablet .. 650 milliGRAM(s) Oral every 6 hours PRN Temp greater or equal to 38C (100.4F), Mild Pain (1 - 3)  aluminum hydroxide/magnesium hydroxide/simethicone Suspension 30 milliLiter(s) Oral every 4 hours PRN Dyspepsia  dextrose Oral Gel 15 Gram(s) Oral once PRN Blood Glucose LESS THAN 70 milliGRAM(s)/deciliter  melatonin 3 milliGRAM(s) Oral at bedtime PRN Insomnia  ondansetron Injectable 4 milliGRAM(s) IV Push every 8 hours PRN Nausea and/or Vomiting    Vital Signs Last 24 Hrs  T(F): 98.1 (25 Dec 2022 06:10), Max: 98.6 (24 Dec 2022 09:00)  HR: 60 (25 Dec 2022 06:10) (60 - 76)  BP: 109/57 (25 Dec 2022 06:10) (109/57 - 139/70)  RR: 18 (25 Dec 2022 06:10) (16 - 18)  SpO2: 98% (25 Dec 2022 06:10) (98% - 100%)  I&O's Summary    24 Dec 2022 07:01  -  25 Dec 2022 07:00  --------------------------------------------------------  IN: 240 mL / OUT: 600 mL / NET: -360 mL      PHYSICAL EXAM:  General: 48 y/o male in  NAD, A/O x 3  ENT: MMM  Neck: Supple, No JVD  Lungs: Clear to auscultation bilaterally, Non labored breathing   Cardio: RRR, S1/S2, No murmurs  Abdomen: Soft, Nontender, Nondistended; Bowel sounds present  Extremities: No calf tenderness, No pitting edema    LABS:                        8.2    9.14  )-----------( 195      ( 25 Dec 2022 05:25 )             24.3     12-25    139  |  105  |  18  ----------------------------<  169  4.0   |  27  |  0.95    Ca    8.0      25 Dec 2022 05:25    TPro  5.0  /  Alb  3.0  /  TBili  1.2  /  DBili  x   /  AST  13  /  ALT  27  /  AlkPhos  29                              POCT Blood Glucose.: 182 mg/dL (24 Dec 2022 21:03)  POCT Blood Glucose.: 158 mg/dL (24 Dec 2022 17:32)  POCT Blood Glucose.: 186 mg/dL (24 Dec 2022 11:43)  POCT Blood Glucose.: 215 mg/dL (24 Dec 2022 07:54)      Urinalysis Basic - ( 23 Dec 2022 16:40 )    Color: Yellow / Appearance: Clear / S.015 / pH: x  Gluc: x / Ketone: Moderate  / Bili: Negative / Urobili: Negative   Blood: x / Protein: Negative / Nitrite: Negative   Leuk Esterase: Negative / RBC: x / WBC x   Sq Epi: x / Non Sq Epi: x / Bacteria: x          RADIOLOGY & ADDITIONAL TESTS:    Care Discussed with Consultants/Other Providers:    Patient is a 47y old  Male who presents with a chief complaint of GIB (24 Dec 2022 16:49)    Patient seen and examined at bedside.  Pt with c/o headache. Pt otherwise not having any symptoms of dizziness or lightheadness with ambulating or sitting .      ALLERGIES:  No Known Allergies    MEDICATIONS  (STANDING):  dextrose 5%. 1000 milliLiter(s) (100 mL/Hr) IV Continuous <Continuous>  dextrose 5%. 1000 milliLiter(s) (50 mL/Hr) IV Continuous <Continuous>  dextrose 50% Injectable 25 Gram(s) IV Push once  dextrose 50% Injectable 12.5 Gram(s) IV Push once  dextrose 50% Injectable 25 Gram(s) IV Push once  glucagon  Injectable 1 milliGRAM(s) IntraMuscular once  insulin lispro (ADMELOG) corrective regimen sliding scale   SubCutaneous every 6 hours  insulin lispro (ADMELOG) corrective regimen sliding scale   SubCutaneous three times a day before meals  pantoprazole  Injectable 40 milliGRAM(s) IV Push every 12 hours  polyethylene glycol/electrolyte Solution 1 milliLiter(s) Oral once  sodium chloride 0.9%. 1000 milliLiter(s) (75 mL/Hr) IV Continuous <Continuous>    MEDICATIONS  (PRN):  acetaminophen     Tablet .. 650 milliGRAM(s) Oral every 6 hours PRN Temp greater or equal to 38C (100.4F), Mild Pain (1 - 3)  aluminum hydroxide/magnesium hydroxide/simethicone Suspension 30 milliLiter(s) Oral every 4 hours PRN Dyspepsia  dextrose Oral Gel 15 Gram(s) Oral once PRN Blood Glucose LESS THAN 70 milliGRAM(s)/deciliter  melatonin 3 milliGRAM(s) Oral at bedtime PRN Insomnia  ondansetron Injectable 4 milliGRAM(s) IV Push every 8 hours PRN Nausea and/or Vomiting    Vital Signs Last 24 Hrs  T(F): 98.1 (25 Dec 2022 06:10), Max: 98.6 (24 Dec 2022 09:00)  HR: 60 (25 Dec 2022 06:10) (60 - 76)  BP: 109/57 (25 Dec 2022 06:10) (109/57 - 139/70)  RR: 18 (25 Dec 2022 06:10) (16 - 18)  SpO2: 98% (25 Dec 2022 06:10) (98% - 100%)  I&O's Summary    24 Dec 2022 07:01  -  25 Dec 2022 07:00  --------------------------------------------------------  IN: 240 mL / OUT: 600 mL / NET: -360 mL      PHYSICAL EXAM:  General: 48 y/o male in  NAD, A/O x 3  ENT: MMM  Neck: Supple, No JVD  Lungs: Clear to auscultation bilaterally, Non labored breathing   Cardio: RRR, S1/S2, No murmurs  Abdomen: Soft, Nontender, Nondistended; Bowel sounds present  Extremities: No calf tenderness, No pitting edema    LABS:                        8.2    9.14  )-----------( 195      ( 25 Dec 2022 05:25 )             24.3     12-25    139  |  105  |  18  ----------------------------<  169  4.0   |  27  |  0.95    Ca    8.0      25 Dec 2022 05:25    TPro  5.0  /  Alb  3.0  /  TBili  1.2  /  DBili  x   /  AST  13  /  ALT  27  /  AlkPhos  29                              POCT Blood Glucose.: 182 mg/dL (24 Dec 2022 21:03)  POCT Blood Glucose.: 158 mg/dL (24 Dec 2022 17:32)  POCT Blood Glucose.: 186 mg/dL (24 Dec 2022 11:43)  POCT Blood Glucose.: 215 mg/dL (24 Dec 2022 07:54)      Urinalysis Basic - ( 23 Dec 2022 16:40 )    Color: Yellow / Appearance: Clear / S.015 / pH: x  Gluc: x / Ketone: Moderate  / Bili: Negative / Urobili: Negative   Blood: x / Protein: Negative / Nitrite: Negative   Leuk Esterase: Negative / RBC: x / WBC x   Sq Epi: x / Non Sq Epi: x / Bacteria: x          RADIOLOGY & ADDITIONAL TESTS:    Care Discussed with Consultants/Other Providers:

## 2022-12-25 NOTE — PROGRESS NOTE ADULT - ASSESSMENT
47 year old M PMH DM type 2 coming in for black stools for 1-2 days admitted for GIB.    *Melena:  *Acute blood loss anemia:  *GIB:  Guiac positive   Hgb 8.1 this AM  1 iu PRBC this AM  Maintain active T+S  s/p EGD yesterday--still with retained food--needs repeat  PPI BID  GI following  Maintain 2 PIVs at all time   Advance diet to clears today  CTAP with mass at GE junction--plan for EGD/EUS--outpatient vs inpatient depending on hemodynamic stability     CT angio of abd /pelvis  IMPRESSION: No CT evidence for active extravasation of intravascular   contrast. There is a rounded density identified arising from the   posterior gastric wall adjacent to the GE junction measuring 3.5 cm   suspicious for gastric mass; further evaluation with upper endoscopy is   recommended.      *Leukocytosis:  suspect reactive     *DM II:  HgbA1c 8.5  DIabetes educator appreciated  Monitor FS--cont ISS   POCT Blood Glucose.: 182 mg/dL (24 Dec 2022 21:03)  POCT Blood Glucose.: 158 mg/dL (24 Dec 2022 17:32)  POCT Blood Glucose.: 186 mg/dL (24 Dec 2022 11:43)  POCT Blood Glucose.: 215 mg/dL (24 Dec 2022 07:54)      DVT ppx: Venodynes     *Case d.w Dr Weaver  *Patient will update famiy    DIspo: pending clinical improvement . Suspect home tomorrow if Hgb remains stable with close outpatient follow up    47 year old M PMH DM type 2 coming in for black stools for 1-2 days admitted for GIB.    *Melena:  *Acute blood loss anemia:  *GIB:  Guiac positive   Hgb 8.2 this AM  Maintain active T+S  s/p EGD yesterday--still with retained food--needs repeat  PPI BID  GI following- Discussion this am with DR Weaver - pt ok for discharge and follow up in office Tuesday.  regular diet for now   CTAP with mass at GE junction--plan for EGD/EUS--outpatient  will follow up with GI on tuesday     CT angio of abd /pelvis  IMPRESSION: No CT evidence for active extravasation of intravascular   contrast. There is a rounded density identified arising from the   posterior gastric wall adjacent to the GE junction measuring 3.5 cm   suspicious for gastric mass; further evaluation with upper endoscopy is   recommended.      *Leukocytosis:  suspect reactive     *DM II:  HgbA1c 8.5  DIabetes educator appreciated  Monitor FS--cont ISS   POCT Blood Glucose.: 182 mg/dL (24 Dec 2022 21:03)  POCT Blood Glucose.: 158 mg/dL (24 Dec 2022 17:32)  POCT Blood Glucose.: 186 mg/dL (24 Dec 2022 11:43)  POCT Blood Glucose.: 215 mg/dL (24 Dec 2022 07:54)      DVT ppx: Venodynes     *Case d.w Dr Weaver  *Patient will update famiy    DIspo: anticipate discharge today    47 year old M PMH DM type 2 coming in for black stools for 1-2 days admitted for GIB.    *Melena:  *Acute blood loss anemia:  *GIB:  Guiac positive   Hgb 8.2 this AM  Maintain active T+S  s/p EGD yesterday--still with retained food--needs repeat  PPI BID  GI following- Discussion this am with DR Wevaer - pt ok for discharge and follow up in office Tuesday.  regular diet for now   CTAP with mass at GE junction--plan for EGD/EUS--outpatient  will follow up with GI on tuesday     CT angio of abd /pelvis  IMPRESSION: No CT evidence for active extravasation of intravascular   contrast. There is a rounded density identified arising from the   posterior gastric wall adjacent to the GE junction measuring 3.5 cm   suspicious for gastric mass; further evaluation with upper endoscopy is   recommended.      *Leukocytosis:  suspect reactive     *DM II:  HgbA1c 8.5  DIabetes educator appreciated  will start life style changes and follow up with PMD   Monitor FS--cont ISS   POCT Blood Glucose.: 182 mg/dL (24 Dec 2022 21:03)  POCT Blood Glucose.: 158 mg/dL (24 Dec 2022 17:32)  POCT Blood Glucose.: 186 mg/dL (24 Dec 2022 11:43)  POCT Blood Glucose.: 215 mg/dL (24 Dec 2022 07:54)      DVT ppx: Venodynes     *Case d.w Dr Weaver  *Patient will update famiy    DIspo: anticipate discharge today    47 year old M PMH DM type 2 coming in for black stools for 1-2 days admitted for GIB.    *Melena:  *Acute blood loss anemia:  *GIB:  Guiac positive   Hgb 8.2 this AM  Maintain active T+S  s/p EGD yesterday--still with retained food--needs repeat  PPI BID  GI following- Discussion this am with DR Weaver - pt ok for discharge and follow up in office Tuesday.  regular diet for now   CTAP with mass at GE junction--plan for EGD/EUS--outpatient  will follow up with GI on tuesday     CT angio of abd /pelvis  IMPRESSION: No CT evidence for active extravasation of intravascular   contrast. There is a rounded density identified arising from the   posterior gastric wall adjacent to the GE junction measuring 3.5 cm   suspicious for gastric mass; further evaluation with upper endoscopy is   recommended.    *Leukocytosis:  suspect reactive     *DM II:  HgbA1c 8.5  DIabetes educator appreciated  will start life style changes and follow up with PMD   Monitor FS--cont ISS   POCT Blood Glucose.: 182 mg/dL (24 Dec 2022 21:03)  POCT Blood Glucose.: 158 mg/dL (24 Dec 2022 17:32)  POCT Blood Glucose.: 186 mg/dL (24 Dec 2022 11:43)  POCT Blood Glucose.: 215 mg/dL (24 Dec 2022 07:54)      DVT ppx: Venodynes     *Case d.w Dr Weaver  *Patient will update famiy    DIspo: anticipate discharge today

## 2022-12-25 NOTE — PROGRESS NOTE ADULT - NS ATTEND AMEND GEN_ALL_CORE FT
d/w Dr. Weaver    EGD with retained food. Maintain on clears. GI amenable to outpatient scope (cannot be done until Tuesday unless emergent). Monitor H/H. Receiving 1u pRBC today. PPI
d/w Dr. Weaver  s/p 1u pRBC yesterday. Hg stable. Asymptomatic with ambulation.   Plan to DC today. Follow up Tuesday with GI

## 2022-12-25 NOTE — PROGRESS NOTE ADULT - ASSESSMENT
46 y/o male pmh DMT2, HLD who presents to ER with complaints of fatigue and dark stool. Patient noted fatigue starting on wednesday, found it difficult to go up the stairs. No nausea, vomiting, diarrhea, hematemesis, brbpr or coffee ground emesis. Denies fever/body aches or recent travel. Takes baby asa daily, no additional NSAIDs. No colonoscopy or upper endoscopy in the past. No unintentional weight loss or loss of appetite. Patient notes having physical done a few weeks ago with normal blood work.    Now stable post tz and egd demonstrating old blood but limited by food in stomach, therefore procedure terminated.    CT demonstrated gastric lesion

## 2022-12-25 NOTE — DISCHARGE NOTE NURSING/CASE MANAGEMENT/SOCIAL WORK - PATIENT PORTAL LINK FT
You can access the FollowMyHealth Patient Portal offered by [facility] by registering at the following website: [email or web address]. By joining [employer]’s FollowMyHealth portal, you will also be able to view your health information using other applications (apps) compatible with our system.

## 2022-12-25 NOTE — PROGRESS NOTE ADULT - SUBJECTIVE AND OBJECTIVE BOX
INTERVAL HPI/OVERNIGHT EVENTS:  No bleeding, tolerated clears... advanced diet this morning.    Pt with no BM, no longer light headed.  S/P egd with apparent upper source, indeterminant for etiology.  HPI:  48 y/o male pmh DMT2, HLD who presents to ER with complaints of fatigue and dark stool. Patient noted fatigue starting on wednesday, found it difficult to go up the stairs. No nausea, vomiting, diarrhea, hematemesis, brbpr or coffee ground emesis. Denies fever/body aches or recent travel. Takes baby asa daily, no additional NSAIDs. No colonoscopy or upper endoscopy in the past. No unintentional weight loss or loss of appetite. Patient notes having physical done a few weeks ago with normal blood work.    MEDICATIONS  (STANDING):    MEDICATIONS  (PRN):      Allergies    No Known Allergies    Intolerances        PAST MEDICAL & SURGICAL HISTORY:  DM (diabetes mellitus)          REVIEW OF SYSTEMS: negative unless indicated in HPI    non smoker  social ETOH  ICU Vital Signs Last 24 Hrs  T(C): 36.7 (24 Dec 2022 13:00), Max: 37 (24 Dec 2022 09:00)  T(F): 98 (24 Dec 2022 13:00), Max: 98.6 (24 Dec 2022 09:00)  HR: 71 (24 Dec 2022 13:00) (68 - 136)  BP: 113/61 (24 Dec 2022 13:00) (87/55 - 139/70)  BP(mean): 78 (23 Dec 2022 15:07) (78 - 78)  ABP: --  ABP(mean): --  RR: 16 (24 Dec 2022 13:00) (16 - 18)  SpO2: 100% (24 Dec 2022 13:00) (98% - 100%)    O2 Parameters below as of 24 Dec 2022 13:00  Patient On (Oxygen Delivery Method): room air          PHYSICAL EXAM:   Vital Signs:  Vital Signs Last 24 Hrs  T(C): 36.4 (23 Dec 2022 14:34), Max: 36.4 (23 Dec 2022 14:34)  T(F): 97.5 (23 Dec 2022 14:34), Max: 97.5 (23 Dec 2022 14:34)  HR: 99 (23 Dec 2022 14:45) (99 - 136)  BP: 127/79 (23 Dec 2022 14:45) (87/55 - 127/79)  BP(mean): --  RR: 17 (23 Dec 2022 14:45) (17 - 18)  SpO2: 99% (23 Dec 2022 14:45) (99% - 100%)    Parameters below as of 23 Dec 2022 14:34  Patient On (Oxygen Delivery Method): room air      Daily Height in cm: 177.8 (23 Dec 2022 14:34)    Daily I&O's Summary      GENERAL:  Appears stated age  HEENT:  NC/AT,  conjunctivae clear and pink, sclera +icterus  CHEST:  Full & symmetric excursion, no increased effort, breath sounds clear  HEART:  Regular rhythm, S1, S2, no murmur  ABDOMEN:  Soft, non-tender, non-distended, normoactive bowel sounds  EXTEREMITIES:  no edema  SKIN:  No rash/warm/dry  NEURO:  Alert, oriented, no asterixis, no tremor, no encephalopathy      LABS:                                   8.1    12.36 )-----------( 197      ( 24 Dec 2022 06:45 )             24.6   12-24    144  |  108  |  32<H>  ----------------------------<  239<H>  4.2   |  30  |  0.98    Ca    8.2<L>      24 Dec 2022 06:45    TPro  5.6<L>  /  Alb  3.3  /  TBili  1.0  /  DBili  0.2  /  AST  17  /  ALT  34  /  AlkPhos  29<L>  12-24        RADIOLOGY & ADDITIONAL TESTS:    CT pending

## 2022-12-25 NOTE — PROGRESS NOTE ADULT - PROBLEM SELECTOR PLAN 1
Upper source likely related to gastric lesion seen on CT scan.    Attempting to arrange outpt egd/eus and then likely resection.    Clinically seems to  have stopped bleeding on PPI.    Pt to call my office Tuesday am.
Likely upper source of unclear etiology.    If CT negative would advance diet.  If stable swab pt for covid early am and d/c for outpt enteroscopy and  colonoscopy on Tuesday    if rebleeds, emergent egd

## 2022-12-27 DIAGNOSIS — K92.2 GASTROINTESTINAL HEMORRHAGE, UNSPECIFIED: ICD-10-CM

## 2022-12-27 PROBLEM — Z00.00 ENCOUNTER FOR PREVENTIVE HEALTH EXAMINATION: Status: ACTIVE | Noted: 2022-12-27

## 2022-12-27 PROBLEM — E11.9 TYPE 2 DIABETES MELLITUS WITHOUT COMPLICATIONS: Chronic | Status: ACTIVE | Noted: 2022-12-23

## 2022-12-29 ENCOUNTER — TRANSCRIPTION ENCOUNTER (OUTPATIENT)
Age: 47
End: 2022-12-29

## 2022-12-29 ENCOUNTER — RESULT REVIEW (OUTPATIENT)
Age: 47
End: 2022-12-29

## 2022-12-29 ENCOUNTER — OUTPATIENT (OUTPATIENT)
Dept: OUTPATIENT SERVICES | Facility: HOSPITAL | Age: 47
LOS: 1 days | End: 2022-12-29
Payer: COMMERCIAL

## 2022-12-29 ENCOUNTER — APPOINTMENT (OUTPATIENT)
Dept: GASTROENTEROLOGY | Facility: HOSPITAL | Age: 47
End: 2022-12-29

## 2022-12-29 VITALS
RESPIRATION RATE: 23 BRPM | OXYGEN SATURATION: 100 % | HEART RATE: 67 BPM | WEIGHT: 218.04 LBS | SYSTOLIC BLOOD PRESSURE: 130 MMHG | DIASTOLIC BLOOD PRESSURE: 68 MMHG | HEIGHT: 70 IN | TEMPERATURE: 97 F

## 2022-12-29 VITALS — HEART RATE: 71 BPM | OXYGEN SATURATION: 99 % | SYSTOLIC BLOOD PRESSURE: 117 MMHG | RESPIRATION RATE: 20 BRPM

## 2022-12-29 DIAGNOSIS — K92.2 GASTROINTESTINAL HEMORRHAGE, UNSPECIFIED: ICD-10-CM

## 2022-12-29 DIAGNOSIS — K59.00 CONSTIPATION, UNSPECIFIED: ICD-10-CM

## 2022-12-29 DIAGNOSIS — Z98.890 OTHER SPECIFIED POSTPROCEDURAL STATES: Chronic | ICD-10-CM

## 2022-12-29 LAB — GLUCOSE BLDC GLUCOMTR-MCNC: 225 MG/DL — HIGH (ref 70–99)

## 2022-12-29 PROCEDURE — 88305 TISSUE EXAM BY PATHOLOGIST: CPT

## 2022-12-29 PROCEDURE — 88341 IMHCHEM/IMCYTCHM EA ADD ANTB: CPT

## 2022-12-29 PROCEDURE — 88173 CYTOPATH EVAL FNA REPORT: CPT | Mod: 26

## 2022-12-29 PROCEDURE — 43242 EGD US FINE NEEDLE BX/ASPIR: CPT

## 2022-12-29 PROCEDURE — 82962 GLUCOSE BLOOD TEST: CPT

## 2022-12-29 PROCEDURE — 88173 CYTOPATH EVAL FNA REPORT: CPT

## 2022-12-29 PROCEDURE — 43238 EGD US FINE NEEDLE BX/ASPIR: CPT | Mod: GC

## 2022-12-29 PROCEDURE — 88342 IMHCHEM/IMCYTCHM 1ST ANTB: CPT | Mod: 26

## 2022-12-29 PROCEDURE — 88341 IMHCHEM/IMCYTCHM EA ADD ANTB: CPT | Mod: 26

## 2022-12-29 PROCEDURE — 88305 TISSUE EXAM BY PATHOLOGIST: CPT | Mod: 26

## 2022-12-29 PROCEDURE — 88342 IMHCHEM/IMCYTCHM 1ST ANTB: CPT

## 2022-12-29 RX ORDER — SODIUM CHLORIDE 9 MG/ML
500 INJECTION INTRAMUSCULAR; INTRAVENOUS; SUBCUTANEOUS
Refills: 0 | Status: DISCONTINUED | OUTPATIENT
Start: 2022-12-29 | End: 2023-01-12

## 2022-12-29 RX ORDER — PANTOPRAZOLE 40 MG/1
40 TABLET, DELAYED RELEASE ORAL
Qty: 30 | Refills: 3 | Status: ACTIVE | COMMUNITY
Start: 2022-12-29 | End: 1900-01-01

## 2022-12-29 NOTE — ASU PATIENT PROFILE, ADULT - NSICDXPASTMEDICALHX_GEN_ALL_CORE_FT
PAST MEDICAL HISTORY:  DM (diabetes mellitus)      PAST MEDICAL HISTORY:  DM (diabetes mellitus)     GI bleed     High cholesterol

## 2022-12-29 NOTE — PRE PROCEDURE NOTE - PRE PROCEDURE EVALUATION
Attending Physician: Dr Addison                      Procedure: EGD/EUS/FNB    Indication for Procedure: gastric mass  ________________________________________________________  PAST MEDICAL & SURGICAL HISTORY:  DM (diabetes mellitus)      No significant past surgical history        ALLERGIES:  No Known Allergies    HOME MEDICATIONS:  Ozempic (1 mg dose) 4 mg/3 mL subcutaneous solution: 0.75 milliliter(s) subcutaneous once a week on Thursday    AICD/PPM: [ ] yes   [x ] no    PERTINENT LAB DATA:                      PHYSICAL EXAMINATION:    T(C): --  HR: --  BP: --  RR: --  SpO2: --    Constitutional: NAD  HEENT: PERRLA, EOMI,    Neck:  No JVD  Respiratory: CTAB/L  Cardiovascular: S1 and S2  Gastrointestinal: BS+, soft, NT/ND  Extremities: No peripheral edema  Neurological: A/O x 3, no focal deficits  Psychiatric: Normal mood, normal affect  Skin: No rashes    ASA Class: I [ x]  II [ ]  III [ ]  IV [ ]    COMMENTS:    The patient is a suitable candidate for the planned procedure unless box checked [ ]  No, explain:

## 2022-12-29 NOTE — ASU PATIENT PROFILE, ADULT - NSICDXPASTSURGICALHX_GEN_ALL_CORE_FT
PAST SURGICAL HISTORY:  No significant past surgical history      PAST SURGICAL HISTORY:  History of esophagogastroduodenoscopy (EGD)

## 2023-01-05 PROBLEM — K92.2 GASTROINTESTINAL HEMORRHAGE, UNSPECIFIED: Chronic | Status: ACTIVE | Noted: 2022-12-29

## 2023-01-05 PROBLEM — Z86.39 HISTORY OF TYPE 2 DIABETES MELLITUS: Status: RESOLVED | Noted: 2023-01-05 | Resolved: 2023-01-05

## 2023-01-05 PROBLEM — E78.00 PURE HYPERCHOLESTEROLEMIA, UNSPECIFIED: Chronic | Status: ACTIVE | Noted: 2022-12-29

## 2023-01-05 PROBLEM — Z86.39 HISTORY OF HYPERLIPIDEMIA: Status: RESOLVED | Noted: 2023-01-05 | Resolved: 2023-01-05

## 2023-01-05 PROBLEM — Z01.818 PREOP EXAMINATION: Status: ACTIVE | Noted: 2023-01-05

## 2023-01-05 LAB — NON-GYNECOLOGICAL CYTOLOGY STUDY: SIGNIFICANT CHANGE UP

## 2023-01-06 ENCOUNTER — APPOINTMENT (OUTPATIENT)
Dept: THORACIC SURGERY | Facility: CLINIC | Age: 48
End: 2023-01-06
Payer: COMMERCIAL

## 2023-01-06 VITALS
DIASTOLIC BLOOD PRESSURE: 77 MMHG | TEMPERATURE: 97.2 F | OXYGEN SATURATION: 95 % | RESPIRATION RATE: 18 BRPM | SYSTOLIC BLOOD PRESSURE: 137 MMHG | HEART RATE: 71 BPM

## 2023-01-06 VITALS — HEIGHT: 70 IN | BODY MASS INDEX: 31.21 KG/M2 | WEIGHT: 218.04 LBS

## 2023-01-06 DIAGNOSIS — Z92.89 PERSONAL HISTORY OF OTHER MEDICAL TREATMENT: ICD-10-CM

## 2023-01-06 DIAGNOSIS — Z86.39 PERSONAL HISTORY OF OTHER ENDOCRINE, NUTRITIONAL AND METABOLIC DISEASE: ICD-10-CM

## 2023-01-06 DIAGNOSIS — Z01.818 ENCOUNTER FOR OTHER PREPROCEDURAL EXAMINATION: ICD-10-CM

## 2023-01-06 PROCEDURE — 99244 OFF/OP CNSLTJ NEW/EST MOD 40: CPT

## 2023-01-06 RX ORDER — POLYETHYLENE GLYCOL 3350 17 G/17G
17 POWDER, FOR SOLUTION ORAL DAILY
Qty: 30 | Refills: 3 | Status: COMPLETED | COMMUNITY
Start: 2022-12-29 | End: 2023-01-06

## 2023-01-06 RX ORDER — METFORMIN HYDROCHLORIDE 500 MG/1
500 TABLET, COATED ORAL
Refills: 0 | Status: COMPLETED | COMMUNITY
End: 2023-01-06

## 2023-01-06 RX ORDER — SEMAGLUTIDE 1.34 MG/ML
2 INJECTION, SOLUTION SUBCUTANEOUS
Refills: 0 | Status: ACTIVE | COMMUNITY

## 2023-01-06 RX ORDER — ATORVASTATIN CALCIUM 20 MG/1
20 TABLET, FILM COATED ORAL
Qty: 30 | Refills: 3 | Status: ACTIVE | COMMUNITY

## 2023-01-22 ENCOUNTER — APPOINTMENT (OUTPATIENT)
Dept: NUCLEAR MEDICINE | Facility: CLINIC | Age: 48
End: 2023-01-22
Payer: COMMERCIAL

## 2023-01-22 ENCOUNTER — OUTPATIENT (OUTPATIENT)
Dept: OUTPATIENT SERVICES | Facility: HOSPITAL | Age: 48
LOS: 1 days | End: 2023-01-22
Payer: COMMERCIAL

## 2023-01-22 DIAGNOSIS — Z98.890 OTHER SPECIFIED POSTPROCEDURAL STATES: Chronic | ICD-10-CM

## 2023-01-22 DIAGNOSIS — Z01.818 ENCOUNTER FOR OTHER PREPROCEDURAL EXAMINATION: ICD-10-CM

## 2023-01-22 PROCEDURE — 78815 PET IMAGE W/CT SKULL-THIGH: CPT | Mod: 26,PI

## 2023-01-22 PROCEDURE — A9552: CPT

## 2023-01-22 PROCEDURE — 78815 PET IMAGE W/CT SKULL-THIGH: CPT

## 2023-01-30 ENCOUNTER — OUTPATIENT (OUTPATIENT)
Dept: OUTPATIENT SERVICES | Facility: HOSPITAL | Age: 48
LOS: 1 days | End: 2023-01-30
Payer: COMMERCIAL

## 2023-01-30 VITALS
RESPIRATION RATE: 16 BRPM | DIASTOLIC BLOOD PRESSURE: 76 MMHG | HEIGHT: 71.26 IN | WEIGHT: 220.46 LBS | OXYGEN SATURATION: 99 % | SYSTOLIC BLOOD PRESSURE: 127 MMHG | HEART RATE: 66 BPM | TEMPERATURE: 98 F

## 2023-01-30 DIAGNOSIS — C49.A0 GASTROINTESTINAL STROMAL TUMOR, UNSPECIFIED SITE: ICD-10-CM

## 2023-01-30 DIAGNOSIS — Z98.890 OTHER SPECIFIED POSTPROCEDURAL STATES: Chronic | ICD-10-CM

## 2023-01-30 DIAGNOSIS — E11.9 TYPE 2 DIABETES MELLITUS WITHOUT COMPLICATIONS: ICD-10-CM

## 2023-01-30 LAB
A1C WITH ESTIMATED AVERAGE GLUCOSE RESULT: 7.1 % — HIGH (ref 4–5.6)
ANION GAP SERPL CALC-SCNC: 14 MMOL/L — SIGNIFICANT CHANGE UP (ref 7–14)
BLD GP AB SCN SERPL QL: NEGATIVE — SIGNIFICANT CHANGE UP
BUN SERPL-MCNC: 15 MG/DL — SIGNIFICANT CHANGE UP (ref 7–23)
CALCIUM SERPL-MCNC: 9.4 MG/DL — SIGNIFICANT CHANGE UP (ref 8.4–10.5)
CHLORIDE SERPL-SCNC: 99 MMOL/L — SIGNIFICANT CHANGE UP (ref 98–107)
CO2 SERPL-SCNC: 23 MMOL/L — SIGNIFICANT CHANGE UP (ref 22–31)
CREAT SERPL-MCNC: 0.86 MG/DL — SIGNIFICANT CHANGE UP (ref 0.5–1.3)
EGFR: 107 ML/MIN/1.73M2 — SIGNIFICANT CHANGE UP
ESTIMATED AVERAGE GLUCOSE: 157 — SIGNIFICANT CHANGE UP
GLUCOSE SERPL-MCNC: 240 MG/DL — HIGH (ref 70–99)
HCT VFR BLD CALC: 45.2 % — SIGNIFICANT CHANGE UP (ref 39–50)
HGB BLD-MCNC: 14.4 G/DL — SIGNIFICANT CHANGE UP (ref 13–17)
MCHC RBC-ENTMCNC: 27.8 PG — SIGNIFICANT CHANGE UP (ref 27–34)
MCHC RBC-ENTMCNC: 31.9 GM/DL — LOW (ref 32–36)
MCV RBC AUTO: 87.3 FL — SIGNIFICANT CHANGE UP (ref 80–100)
NRBC # BLD: 0 /100 WBCS — SIGNIFICANT CHANGE UP (ref 0–0)
NRBC # FLD: 0 K/UL — SIGNIFICANT CHANGE UP (ref 0–0)
PLATELET # BLD AUTO: 356 K/UL — SIGNIFICANT CHANGE UP (ref 150–400)
POTASSIUM SERPL-MCNC: 3.7 MMOL/L — SIGNIFICANT CHANGE UP (ref 3.5–5.3)
POTASSIUM SERPL-SCNC: 3.7 MMOL/L — SIGNIFICANT CHANGE UP (ref 3.5–5.3)
RBC # BLD: 5.18 M/UL — SIGNIFICANT CHANGE UP (ref 4.2–5.8)
RBC # FLD: 14.6 % — HIGH (ref 10.3–14.5)
RH IG SCN BLD-IMP: NEGATIVE — SIGNIFICANT CHANGE UP
SODIUM SERPL-SCNC: 136 MMOL/L — SIGNIFICANT CHANGE UP (ref 135–145)
WBC # BLD: 5.73 K/UL — SIGNIFICANT CHANGE UP (ref 3.8–10.5)
WBC # FLD AUTO: 5.73 K/UL — SIGNIFICANT CHANGE UP (ref 3.8–10.5)

## 2023-01-30 PROCEDURE — 93010 ELECTROCARDIOGRAM REPORT: CPT

## 2023-01-30 RX ORDER — ASPIRIN/CALCIUM CARB/MAGNESIUM 324 MG
81 TABLET ORAL
Qty: 0 | Refills: 0 | DISCHARGE

## 2023-01-30 RX ORDER — SODIUM CHLORIDE 9 MG/ML
3 INJECTION INTRAMUSCULAR; INTRAVENOUS; SUBCUTANEOUS EVERY 8 HOURS
Refills: 0 | Status: DISCONTINUED | OUTPATIENT
Start: 2023-02-08 | End: 2023-02-10

## 2023-01-30 RX ORDER — FERROUS SULFATE 325(65) MG
0 TABLET ORAL
Qty: 0 | Refills: 0 | DISCHARGE

## 2023-01-30 RX ORDER — FERROUS SULFATE 325(65) MG
1 TABLET ORAL
Qty: 0 | Refills: 0 | DISCHARGE

## 2023-01-30 NOTE — H&P PST ADULT - NSICDXFAMILYHX_GEN_ALL_CORE_FT
FAMILY HISTORY:  Mother  Still living? Unknown  Family history of diabetes mellitus (DM), Age at diagnosis: Age Unknown  FH: stroke, Age at diagnosis: Age Unknown    Grandparent  Still living? Unknown  Family history of diabetes mellitus (DM), Age at diagnosis: Age Unknown  FH: stroke, Age at diagnosis: Age Unknown

## 2023-01-30 NOTE — H&P PST ADULT - PROBLEM SELECTOR PLAN 1
Pre-op instructions provided. Pt verbalized understanding.   Pt to take own medication for GI ppx.   Pt given detailed verbal and written instructions on chlorhexidine wash. Pt verbalized understanding with teachback.   Order placed for preop COVID PCR testing. Pre-op instructions provided. Pt verbalized understanding.   Pt to take own medication for GI ppx.   Pt given detailed verbal and written instructions on chlorhexidine wash. Pt verbalized understanding with teachback.   Order placed for preop COVID PCR testing.  Pt with recent blood transfusion - states he will return within 72 hours of surgery for repeat T&S.

## 2023-01-30 NOTE — H&P PST ADULT - WEIGHT IN LBS
Patient Information     Patient Name MRN Praveen Tuttle [id number] Male 1944      Nursing Note by Christina Meehan at 1/15/2018  9:45 AM     Author:  Christina Meehan Service:  (none) Author Type:  (none)     Filed:  1/15/2018 10:07 AM Encounter Date:  1/15/2018 Status:  Signed     :  Christina Meehan            Here for 6 month follow up on PSA.  Review of Systems:    Weight loss:    No     Recent fever/chills:  No   Night sweats:   No  Current skin rash:  No   Recent hair loss:  No  Heat intolerance:  No   Cold intolerance:  No  Chest pain:   No   Palpitations:   No  Shortness of breath:  No   Wheezing:   No  Constipation:    No   Diarrhea:   No   Nausea:   No   Vomiting:   No   Kidney/side pain:  No   Back pain:   No  Frequent headaches:  No   Dizziness:     No  Leg swelling:   No   Calf pain:    No    Christina Meehan LPN  1/15/2018  9:57 AM                  
220.4

## 2023-01-30 NOTE — H&P PST ADULT - HISTORY OF PRESENT ILLNESS
47 year old male with recent hospitalization in 12/2022 for upper GI bleed, s/p transfusion 1 units PRBC. 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 Pt presents today for presurgical evaluation  47 year old male with recent hospitalization in 12/2022 for upper GI bleed, s/p transfusion 1 units PRBC. 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 Pt presents today for presurgical evaluation for Robotic Esophagogastroduodenoscopy, Laparoscopic Resection of Gastrointestinal Stromal Tumor (GIST), Possible Esophagogastrectomy.

## 2023-02-06 ENCOUNTER — OUTPATIENT (OUTPATIENT)
Dept: OUTPATIENT SERVICES | Facility: HOSPITAL | Age: 48
LOS: 1 days | End: 2023-02-06

## 2023-02-06 DIAGNOSIS — C49.A0 GASTROINTESTINAL STROMAL TUMOR, UNSPECIFIED SITE: ICD-10-CM

## 2023-02-06 DIAGNOSIS — Z98.890 OTHER SPECIFIED POSTPROCEDURAL STATES: Chronic | ICD-10-CM

## 2023-02-07 ENCOUNTER — TRANSCRIPTION ENCOUNTER (OUTPATIENT)
Age: 48
End: 2023-02-07

## 2023-02-07 NOTE — ASU PATIENT PROFILE, ADULT - FALL HARM RISK - UNIVERSAL INTERVENTIONS
Bed in lowest position, wheels locked, appropriate side rails in place/Call bell, personal items and telephone in reach/Instruct patient to call for assistance before getting out of bed or chair/Non-slip footwear when patient is out of bed/[place] to call system/Physically safe environment - no spills, clutter or unnecessary equipment/Purposeful Proactive Rounding/Room/bathroom lighting operational, light cord in reach

## 2023-02-08 ENCOUNTER — INPATIENT (INPATIENT)
Facility: HOSPITAL | Age: 48
LOS: 1 days | Discharge: HOME | End: 2023-02-10
Attending: THORACIC SURGERY (CARDIOTHORACIC VASCULAR SURGERY) | Admitting: THORACIC SURGERY (CARDIOTHORACIC VASCULAR SURGERY)
Payer: COMMERCIAL

## 2023-02-08 ENCOUNTER — RESULT REVIEW (OUTPATIENT)
Age: 48
End: 2023-02-08

## 2023-02-08 ENCOUNTER — TRANSCRIPTION ENCOUNTER (OUTPATIENT)
Age: 48
End: 2023-02-08

## 2023-02-08 ENCOUNTER — APPOINTMENT (OUTPATIENT)
Dept: THORACIC SURGERY | Facility: HOSPITAL | Age: 48
End: 2023-02-08

## 2023-02-08 VITALS
HEIGHT: 70 IN | TEMPERATURE: 98 F | DIASTOLIC BLOOD PRESSURE: 71 MMHG | WEIGHT: 220.46 LBS | SYSTOLIC BLOOD PRESSURE: 120 MMHG | OXYGEN SATURATION: 97 % | RESPIRATION RATE: 16 BRPM | HEART RATE: 60 BPM

## 2023-02-08 DIAGNOSIS — Z98.890 OTHER SPECIFIED POSTPROCEDURAL STATES: Chronic | ICD-10-CM

## 2023-02-08 DIAGNOSIS — C49.A0 GASTROINTESTINAL STROMAL TUMOR, UNSPECIFIED SITE: ICD-10-CM

## 2023-02-08 LAB
GLUCOSE BLDC GLUCOMTR-MCNC: 165 MG/DL — HIGH (ref 70–99)
GLUCOSE BLDC GLUCOMTR-MCNC: 205 MG/DL — HIGH (ref 70–99)
GLUCOSE BLDC GLUCOMTR-MCNC: 311 MG/DL — HIGH (ref 70–99)

## 2023-02-08 PROCEDURE — 88302 TISSUE EXAM BY PATHOLOGIST: CPT | Mod: 26

## 2023-02-08 PROCEDURE — 43611 EXCISION OF STOMACH LESION: CPT

## 2023-02-08 PROCEDURE — 88307 TISSUE EXAM BY PATHOLOGIST: CPT | Mod: 26

## 2023-02-08 PROCEDURE — 43611 EXCISION OF STOMACH LESION: CPT | Mod: 80

## 2023-02-08 PROCEDURE — 88342 IMHCHEM/IMCYTCHM 1ST ANTB: CPT | Mod: 26

## 2023-02-08 PROCEDURE — 49591 RPR AA HRN 1ST < 3 CM RDC: CPT

## 2023-02-08 PROCEDURE — 43235 EGD DIAGNOSTIC BRUSH WASH: CPT

## 2023-02-08 PROCEDURE — S2900 ROBOTIC SURGICAL SYSTEM: CPT | Mod: NC

## 2023-02-08 PROCEDURE — 88341 IMHCHEM/IMCYTCHM EA ADD ANTB: CPT | Mod: 26

## 2023-02-08 PROCEDURE — 88309 TISSUE EXAM BY PATHOLOGIST: CPT | Mod: 26

## 2023-02-08 PROCEDURE — 99233 SBSQ HOSP IP/OBS HIGH 50: CPT

## 2023-02-08 DEVICE — STAPLER COVIDIEN TRI-STAPLE 45MM PURPLE RELOAD: Type: IMPLANTABLE DEVICE | Status: FUNCTIONAL

## 2023-02-08 DEVICE — STAPLER COVIDIEN TRI-STAPLE 45MM PURPLE INTELLIGENT RELOAD: Type: IMPLANTABLE DEVICE | Status: FUNCTIONAL

## 2023-02-08 DEVICE — LIGATING CLIPS WECK HEMOLOK POLYMER LARGE (PURPLE) 6: Type: IMPLANTABLE DEVICE | Status: FUNCTIONAL

## 2023-02-08 RX ORDER — SODIUM CHLORIDE 9 MG/ML
1000 INJECTION, SOLUTION INTRAVENOUS
Refills: 0 | Status: DISCONTINUED | OUTPATIENT
Start: 2023-02-08 | End: 2023-02-10

## 2023-02-08 RX ORDER — ACETAMINOPHEN 500 MG
1000 TABLET ORAL EVERY 6 HOURS
Refills: 0 | Status: COMPLETED | OUTPATIENT
Start: 2023-02-08 | End: 2023-02-09

## 2023-02-08 RX ORDER — DEXTROSE 50 % IN WATER 50 %
12.5 SYRINGE (ML) INTRAVENOUS ONCE
Refills: 0 | Status: DISCONTINUED | OUTPATIENT
Start: 2023-02-08 | End: 2023-02-10

## 2023-02-08 RX ORDER — DEXTROSE 50 % IN WATER 50 %
25 SYRINGE (ML) INTRAVENOUS ONCE
Refills: 0 | Status: DISCONTINUED | OUTPATIENT
Start: 2023-02-08 | End: 2023-02-10

## 2023-02-08 RX ORDER — PANTOPRAZOLE SODIUM 20 MG/1
1 TABLET, DELAYED RELEASE ORAL
Qty: 0 | Refills: 0 | DISCHARGE

## 2023-02-08 RX ORDER — FERROUS SULFATE 325(65) MG
1 TABLET ORAL
Qty: 0 | Refills: 0 | DISCHARGE

## 2023-02-08 RX ORDER — HYDROMORPHONE HYDROCHLORIDE 2 MG/ML
30 INJECTION INTRAMUSCULAR; INTRAVENOUS; SUBCUTANEOUS
Refills: 0 | Status: DISCONTINUED | OUTPATIENT
Start: 2023-02-08 | End: 2023-02-10

## 2023-02-08 RX ORDER — CEFOTETAN DISODIUM 1 G
2 VIAL (EA) INJECTION EVERY 12 HOURS
Refills: 0 | Status: COMPLETED | OUTPATIENT
Start: 2023-02-08 | End: 2023-02-09

## 2023-02-08 RX ORDER — HEPARIN SODIUM 5000 [USP'U]/ML
5000 INJECTION INTRAVENOUS; SUBCUTANEOUS EVERY 8 HOURS
Refills: 0 | Status: DISCONTINUED | OUTPATIENT
Start: 2023-02-08 | End: 2023-02-10

## 2023-02-08 RX ORDER — CHOLECALCIFEROL (VITAMIN D3) 125 MCG
1 CAPSULE ORAL
Qty: 0 | Refills: 0 | DISCHARGE

## 2023-02-08 RX ORDER — PANTOPRAZOLE SODIUM 20 MG/1
40 TABLET, DELAYED RELEASE ORAL EVERY 12 HOURS
Refills: 0 | Status: DISCONTINUED | OUTPATIENT
Start: 2023-02-08 | End: 2023-02-10

## 2023-02-08 RX ORDER — NALOXONE HYDROCHLORIDE 4 MG/.1ML
0.1 SPRAY NASAL
Refills: 0 | Status: DISCONTINUED | OUTPATIENT
Start: 2023-02-08 | End: 2023-02-10

## 2023-02-08 RX ORDER — HYDROMORPHONE HYDROCHLORIDE 2 MG/ML
0.5 INJECTION INTRAMUSCULAR; INTRAVENOUS; SUBCUTANEOUS
Refills: 0 | Status: DISCONTINUED | OUTPATIENT
Start: 2023-02-08 | End: 2023-02-10

## 2023-02-08 RX ORDER — ONDANSETRON 8 MG/1
4 TABLET, FILM COATED ORAL EVERY 6 HOURS
Refills: 0 | Status: DISCONTINUED | OUTPATIENT
Start: 2023-02-08 | End: 2023-02-10

## 2023-02-08 RX ORDER — GLUCAGON INJECTION, SOLUTION 0.5 MG/.1ML
1 INJECTION, SOLUTION SUBCUTANEOUS ONCE
Refills: 0 | Status: DISCONTINUED | OUTPATIENT
Start: 2023-02-08 | End: 2023-02-10

## 2023-02-08 RX ORDER — DEXTROSE 50 % IN WATER 50 %
15 SYRINGE (ML) INTRAVENOUS ONCE
Refills: 0 | Status: DISCONTINUED | OUTPATIENT
Start: 2023-02-08 | End: 2023-02-10

## 2023-02-08 RX ORDER — SODIUM CHLORIDE 9 MG/ML
1000 INJECTION, SOLUTION INTRAVENOUS
Refills: 0 | Status: DISCONTINUED | OUTPATIENT
Start: 2023-02-08 | End: 2023-02-08

## 2023-02-08 RX ORDER — ATORVASTATIN CALCIUM 80 MG/1
1 TABLET, FILM COATED ORAL
Qty: 0 | Refills: 0 | DISCHARGE

## 2023-02-08 RX ORDER — SEMAGLUTIDE 0.68 MG/ML
0.75 INJECTION, SOLUTION SUBCUTANEOUS
Qty: 0 | Refills: 0 | DISCHARGE

## 2023-02-08 RX ORDER — ACETAMINOPHEN 500 MG
1000 TABLET ORAL ONCE
Refills: 0 | Status: DISCONTINUED | OUTPATIENT
Start: 2023-02-08 | End: 2023-02-10

## 2023-02-08 RX ORDER — INSULIN LISPRO 100/ML
VIAL (ML) SUBCUTANEOUS EVERY 6 HOURS
Refills: 0 | Status: DISCONTINUED | OUTPATIENT
Start: 2023-02-08 | End: 2023-02-10

## 2023-02-08 RX ADMIN — SODIUM CHLORIDE 125 MILLILITER(S): 9 INJECTION, SOLUTION INTRAVENOUS at 19:18

## 2023-02-08 RX ADMIN — SODIUM CHLORIDE 3 MILLILITER(S): 9 INJECTION INTRAMUSCULAR; INTRAVENOUS; SUBCUTANEOUS at 13:54

## 2023-02-08 RX ADMIN — HEPARIN SODIUM 5000 UNIT(S): 5000 INJECTION INTRAVENOUS; SUBCUTANEOUS at 21:18

## 2023-02-08 RX ADMIN — Medication 8: at 12:37

## 2023-02-08 RX ADMIN — HEPARIN SODIUM 5000 UNIT(S): 5000 INJECTION INTRAVENOUS; SUBCUTANEOUS at 14:02

## 2023-02-08 RX ADMIN — Medication 100 GRAM(S): at 18:07

## 2023-02-08 RX ADMIN — Medication 0.5 MILLIGRAM(S): at 18:23

## 2023-02-08 RX ADMIN — Medication 400 MILLIGRAM(S): at 18:06

## 2023-02-08 RX ADMIN — Medication 4: at 18:06

## 2023-02-08 RX ADMIN — SODIUM CHLORIDE 3 MILLILITER(S): 9 INJECTION INTRAMUSCULAR; INTRAVENOUS; SUBCUTANEOUS at 21:53

## 2023-02-08 RX ADMIN — PANTOPRAZOLE SODIUM 40 MILLIGRAM(S): 20 TABLET, DELAYED RELEASE ORAL at 18:06

## 2023-02-08 RX ADMIN — Medication 0.5 MILLIGRAM(S): at 15:17

## 2023-02-08 RX ADMIN — HYDROMORPHONE HYDROCHLORIDE 30 MILLILITER(S): 2 INJECTION INTRAMUSCULAR; INTRAVENOUS; SUBCUTANEOUS at 12:04

## 2023-02-08 NOTE — BRIEF OPERATIVE NOTE - COMMENTS
I, Bobby Nuñez PA-C provided direct first assist support to the surgeon during this surgical procedure. My involvement included positioning, prepping and draping the patient prior to surgery, robotic port placement, robotic docking, robotic instrument insertion and removal, ensuring clear visibility and exposure for the surgeon by using instruments such as retractors, suction and sponges, stapling tissues and vessels, retrieving specimens from the operative field, closing surgical incisions, undocking the robot and dressing wounds.  As well as other tasks as directed by the surgeon.

## 2023-02-08 NOTE — BRIEF OPERATIVE NOTE - NSICDXBRIEFPREOP_GEN_ALL_CORE_FT
PRE-OP DIAGNOSIS:  Gastrointestinal stromal tumor (GIST) 08-Feb-2023 11:27:00 Stomach Maris Del Angel

## 2023-02-08 NOTE — PATIENT PROFILE ADULT - FALL HARM RISK - HARM RISK INTERVENTIONS

## 2023-02-08 NOTE — PATIENT PROFILE ADULT - FUNCTIONAL ASSESSMENT - BASIC MOBILITY 3.
Addended by: CARRILLO CANTU on: 2/5/2021 08:25 AM     Modules accepted: Orders     4 = No assist / stand by assistance

## 2023-02-08 NOTE — PROGRESS NOTE ADULT - SUBJECTIVE AND OBJECTIVE BOX
CHIEF COMPLAINT: FOLLOW UP IN ICU FOR POSTOPERATIVE CARE OF PATIENT WHO IS S/P       PROCEDURES:       ISSUES:     GIST tumor  Postoperative pain      INTERVAL EVENTS:   OR today. Extubated in OR. Transferred to CTICU.      HISTORY:   C/o discomfort with NGT    PHYSICAL EXAM:   Gen: Comfortable, No acute distress  Eyes: Sclera white, Conjunctiva normal, Eyelids normal, Pupils symmetrical   ENT: Mucous membranes moist,  ,  ,    Neck: Trachea midline,  ,  ,  ,  ,  ,    CV: Rate regular, Rhythm regular,  ,  ,    Resp: Breath sounds clear, No accessory muscles use,  Abd: Soft, Non-distended, Non-tender, Bowel sounds normal,  ,  ,    Skin: Warm, No peripheral edema of lower extremities,  ,    : No patel  Neuro: Moving all 4 extremities,    Psych: A&Ox3      ASSESSMENT AND PLAN:     NEURO:  Post-operative Pain - Stable. Pain control with PCA and Tylenol IV PRN.            RESPIRATORY:  No Issues         CARDIOVASCULAR:  Hemodynamically stable - Not on pressors. Continue hemodynamic monitoring.  Telemetry (medical test) - Reviewed by me today independently. Normal sinus rhythm.                  RENAL:    Stable - Monitor IOs and electrolytes. Keep K above 4.0 and Mg above 2.0. Maintenance IVF.            GASTROINTESTINAL:    GI prophylaxis PPI Q12h  Zofran and Reglan IV PRN for nausea  NPO  NGT to low continuous suction           HEMATOLOGIC:    No signs of active bleeding. Monitor Hgb in CBC in AM  DVT prophylaxis with heparin subQ and SCDs.          INFECTIOUS DISEASE:    All surgical sites appear clean. No signs of active infection. Will monitor for fever and leukocytosis.         ENDOCRINE:    Stable – Monitor glucose fingersticks for goal 120-180.               ONCOLOGY:  GIST tumor - Improved. S/P resection. Follow up final pathology.      Pertinent clinical, laboratory, radiographic, hemodynamic, echocardiographic, respiratory data, microbiologic data and chart were reviewed by myself and analyzed frequently throughout the course of the day and night by myself.    Plan discussed at length with the CTICU staff and Attending CT Surgeon -   Dr Jake Carver.      Patient's status was discussed with patient at bedside.     ________________________________________________      _________________________  VITAL SIGNS:  Vital Signs Last 24 Hrs  T(C): 36.6 (08 Feb 2023 11:30), Max: 36.8 (08 Feb 2023 06:03)  T(F): 97.9 (08 Feb 2023 11:30), Max: 98.2 (08 Feb 2023 06:03)  HR: 82 (08 Feb 2023 15:00) (60 - 83)  BP: 173/91 (08 Feb 2023 15:00) (120/71 - 173/91)  BP(mean): 114 (08 Feb 2023 15:00) (88 - 114)  RR: 19 (08 Feb 2023 15:00) (16 - 26)  SpO2: 96% (08 Feb 2023 15:00) (96% - 97%)    Parameters below as of 08 Feb 2023 15:00  Patient On (Oxygen Delivery Method): nasal cannula w/ humidification  O2 Flow (L/min): 2    I/Os:   I&O's Detail    08 Feb 2023 07:01  -  08 Feb 2023 16:21  --------------------------------------------------------  IN:    Enteral Tube Flush: 60 mL    Lactated Ringers: 500 mL    Lactated Ringers: 125 mL  Total IN: 685 mL    OUT:    Voided (mL): 500 mL  Total OUT: 500 mL    Total NET: 185 mL              MEDICATIONS:  MEDICATIONS  (STANDING):  acetaminophen   IVPB .. 1000 milliGRAM(s) IV Intermittent every 6 hours  cefoTEtan  IVPB 2 Gram(s) IV Intermittent every 12 hours  dextrose 5%. 1000 milliLiter(s) (100 mL/Hr) IV Continuous <Continuous>  dextrose 5%. 1000 milliLiter(s) (50 mL/Hr) IV Continuous <Continuous>  dextrose 50% Injectable 25 Gram(s) IV Push once  dextrose 50% Injectable 12.5 Gram(s) IV Push once  dextrose 50% Injectable 25 Gram(s) IV Push once  glucagon  Injectable 1 milliGRAM(s) IntraMuscular once  heparin   Injectable 5000 Unit(s) SubCutaneous every 8 hours  HYDROmorphone PCA (1 mG/mL) 30 milliLiter(s) PCA Continuous PCA Continuous  insulin lispro (ADMELOG) corrective regimen sliding scale   SubCutaneous every 6 hours  lactated ringers. 1000 milliLiter(s) (125 mL/Hr) IV Continuous <Continuous>  pantoprazole  Injectable 40 milliGRAM(s) IV Push every 12 hours  sodium chloride 0.9% lock flush 3 milliLiter(s) IV Push every 8 hours    MEDICATIONS  (PRN):  dextrose Oral Gel 15 Gram(s) Oral once PRN Blood Glucose LESS THAN 70 milliGRAM(s)/deciliter  HYDROmorphone PCA (1 mG/mL) Rescue Clinician Bolus 0.5 milliGRAM(s) IV Push every 15 minutes PRN for Pain Scale GREATER THAN 6  naloxone Injectable 0.1 milliGRAM(s) IV Push every 3 minutes PRN For ANY of the following changes in patient status:  A. RR LESS THAN 10 breaths per minute, B. Oxygen saturation LESS THAN 90%, C. Sedation score of 6  ondansetron Injectable 4 milliGRAM(s) IV Push every 6 hours PRN Nausea      LABS:  Laboratory data was independently reviewed by me today.                       RADIOLOGY:   Radiology images were independently reviewed by me today. Reports were reviewed by me today.         CHIEF COMPLAINT: FOLLOW UP IN ICU FOR POSTOPERATIVE CARE OF PATIENT WHO IS S/P GIST tumor resection      PROCEDURES:                     Robot-assisted laparoscopic surgical removal of lesion of stomach, creation of omental flap over repair, repair of umbilical hernia with lipectomy 08-Feb-2023                       ISSUES:     GIST tumor  Postoperative pain      INTERVAL EVENTS:   OR today. Extubated in OR. Transferred to CTICU.      HISTORY:     C/O discomfort with NGT, episode of diaphoresis/anxiety improved after ativan and reassurance, anxious about the NGT      PHYSICAL EXAM:   Gen: Comfortable, No acute distress  Eyes: Sclera white, Conjunctiva normal, Eyelids normal, Pupils symmetrical   ENT: Mucous membranes moist,  ,  ,    Neck: Trachea midline,  ,  ,  ,  ,  ,    CV: Rate regular, Rhythm regular,  ,  ,    Resp: Breath sounds clear, No accessory muscles use,  Abd: Soft, Non-distended, Non-tender, Bowel sounds normal,  ,  ,    Skin: Warm, No peripheral edema of lower extremities,  ,    : No patel  Neuro: Moving all 4 extremities,    Psych: A&Ox3      ASSESSMENT AND PLAN:     NEURO:  Post-operative Pain - Stable. Pain control with PCA and Tylenol IV PRN.            RESPIRATORY:  No Issues         CARDIOVASCULAR:  Hemodynamically stable - Not on pressors. Continue hemodynamic monitoring.  Telemetry (medical test) - Reviewed by me today independently. Normal sinus rhythm.                  RENAL:    Stable - Monitor IOs and electrolytes. Keep K above 4.0 and Mg above 2.0. Maintenance IVF.            GASTROINTESTINAL:    GI prophylaxis PPI Q12h  Zofran and Reglan IV PRN for nausea  NPO  NGT to low continuous suction           HEMATOLOGIC:    No signs of active bleeding. Monitor Hgb in CBC in AM  DVT prophylaxis with heparin subQ and SCDs.          INFECTIOUS DISEASE:    All surgical sites appear clean. No signs of active infection. Will monitor for fever and leukocytosis.         ENDOCRINE:    Stable – Monitor glucose fingersticks for goal 120-180.               ONCOLOGY:  GIST tumor - Improved. S/P resection. Follow up final pathology.      Pertinent clinical, laboratory, radiographic, hemodynamic, echocardiographic, respiratory data, microbiologic data and chart were reviewed by myself and analyzed frequently throughout the course of the day and night by myself.    Plan discussed at length with the CTICU staff and Attending CT Surgeon -   Dr Jake Carver.      Patient's status was discussed with patient at bedside.     ________________________________________________      _________________________  VITAL SIGNS:  Vital Signs Last 24 Hrs  T(C): 36.6 (08 Feb 2023 11:30), Max: 36.8 (08 Feb 2023 06:03)  T(F): 97.9 (08 Feb 2023 11:30), Max: 98.2 (08 Feb 2023 06:03)  HR: 82 (08 Feb 2023 15:00) (60 - 83)  BP: 173/91 (08 Feb 2023 15:00) (120/71 - 173/91)  BP(mean): 114 (08 Feb 2023 15:00) (88 - 114)  RR: 19 (08 Feb 2023 15:00) (16 - 26)  SpO2: 96% (08 Feb 2023 15:00) (96% - 97%)    Parameters below as of 08 Feb 2023 15:00  Patient On (Oxygen Delivery Method): nasal cannula w/ humidification  O2 Flow (L/min): 2    I/Os:   I&O's Detail    08 Feb 2023 07:01  -  08 Feb 2023 16:21  --------------------------------------------------------  IN:    Enteral Tube Flush: 60 mL    Lactated Ringers: 500 mL    Lactated Ringers: 125 mL  Total IN: 685 mL    OUT:    Voided (mL): 500 mL  Total OUT: 500 mL    Total NET: 185 mL              MEDICATIONS:  MEDICATIONS  (STANDING):  acetaminophen   IVPB .. 1000 milliGRAM(s) IV Intermittent every 6 hours  cefoTEtan  IVPB 2 Gram(s) IV Intermittent every 12 hours  dextrose 5%. 1000 milliLiter(s) (100 mL/Hr) IV Continuous <Continuous>  dextrose 5%. 1000 milliLiter(s) (50 mL/Hr) IV Continuous <Continuous>  dextrose 50% Injectable 25 Gram(s) IV Push once  dextrose 50% Injectable 12.5 Gram(s) IV Push once  dextrose 50% Injectable 25 Gram(s) IV Push once  glucagon  Injectable 1 milliGRAM(s) IntraMuscular once  heparin   Injectable 5000 Unit(s) SubCutaneous every 8 hours  HYDROmorphone PCA (1 mG/mL) 30 milliLiter(s) PCA Continuous PCA Continuous  insulin lispro (ADMELOG) corrective regimen sliding scale   SubCutaneous every 6 hours  lactated ringers. 1000 milliLiter(s) (125 mL/Hr) IV Continuous <Continuous>  pantoprazole  Injectable 40 milliGRAM(s) IV Push every 12 hours  sodium chloride 0.9% lock flush 3 milliLiter(s) IV Push every 8 hours    MEDICATIONS  (PRN):  dextrose Oral Gel 15 Gram(s) Oral once PRN Blood Glucose LESS THAN 70 milliGRAM(s)/deciliter  HYDROmorphone PCA (1 mG/mL) Rescue Clinician Bolus 0.5 milliGRAM(s) IV Push every 15 minutes PRN for Pain Scale GREATER THAN 6  naloxone Injectable 0.1 milliGRAM(s) IV Push every 3 minutes PRN For ANY of the following changes in patient status:  A. RR LESS THAN 10 breaths per minute, B. Oxygen saturation LESS THAN 90%, C. Sedation score of 6  ondansetron Injectable 4 milliGRAM(s) IV Push every 6 hours PRN Nausea      LABS:  Laboratory data was independently reviewed by me today.                       RADIOLOGY:   Radiology images were independently reviewed by me today. Reports were reviewed by me today.         CHIEF COMPLAINT: FOLLOW UP IN ICU FOR POSTOPERATIVE CARE OF PATIENT WHO IS S/P GIST tumor resection      PROCEDURES:                     Robot-assisted laparoscopic surgical removal of lesion of stomach, creation of omental flap over repair, repair of umbilical hernia with lipectomy 08-Feb-2023                       ISSUES:     GIST tumor  Postoperative pain  DM  Hyperlipidemia  GI bleed    INTERVAL EVENTS:   OR today. Extubated in OR. Transferred to CTICU.      HISTORY:     C/O discomfort with NGT, episode of diaphoresis/anxiety improved after ativan and reassurance, anxious about the NGT      PHYSICAL EXAM:   Gen: Comfortable, No acute distress  Eyes: Sclera white, Conjunctiva normal, Eyelids normal, Pupils symmetrical   ENT: Mucous membranes moist,  ,  ,    Neck: Trachea midline,  ,  ,  ,  ,  ,    CV: Rate regular, Rhythm regular,  ,  ,    Resp: Breath sounds clear, No accessory muscles use,  Abd: Soft, Non-distended, Non-tender, Bowel sounds normal,  ,  ,    Skin: Warm, No peripheral edema of lower extremities,  ,    : No patel  Neuro: Moving all 4 extremities,    Psych: A&Ox3      ASSESSMENT AND PLAN:     NEURO:  Post-operative Pain - Stable. Pain control with PCA and Tylenol IV PRN.            RESPIRATORY:  No Issues         CARDIOVASCULAR:  Hemodynamically stable - Not on pressors. Continue hemodynamic monitoring.  Telemetry (medical test) - Reviewed by me today independently. Normal sinus rhythm.                  RENAL:    Stable - Monitor IOs and electrolytes. Keep K above 4.0 and Mg above 2.0. Maintenance IVF.            GASTROINTESTINAL:    GI prophylaxis PPI Q12h  Zofran and Reglan IV PRN for nausea  NPO  NGT to low continuous suction           HEMATOLOGIC:    No signs of active bleeding. Monitor Hgb in CBC in AM  DVT prophylaxis with heparin subQ and SCDs.          INFECTIOUS DISEASE:    All surgical sites appear clean. No signs of active infection. Will monitor for fever and leukocytosis.         ENDOCRINE:    Stable – Monitor glucose fingersticks for goal 120-180. Lispro SS               ONCOLOGY:  GIST tumor - Improved. S/P resection. Follow up final pathology.      Pertinent clinical, laboratory, radiographic, hemodynamic, echocardiographic, respiratory data, microbiologic data and chart were reviewed by myself and analyzed frequently throughout the course of the day and night by myself.    Plan discussed at length with the CTICU staff and Attending CT Surgeon -   Dr Jake Carver.      Patient's status was discussed with patient at bedside.     ________________________________________________      _________________________  VITAL SIGNS:  Vital Signs Last 24 Hrs  T(C): 36.6 (08 Feb 2023 11:30), Max: 36.8 (08 Feb 2023 06:03)  T(F): 97.9 (08 Feb 2023 11:30), Max: 98.2 (08 Feb 2023 06:03)  HR: 82 (08 Feb 2023 15:00) (60 - 83)  BP: 173/91 (08 Feb 2023 15:00) (120/71 - 173/91)  BP(mean): 114 (08 Feb 2023 15:00) (88 - 114)  RR: 19 (08 Feb 2023 15:00) (16 - 26)  SpO2: 96% (08 Feb 2023 15:00) (96% - 97%)    Parameters below as of 08 Feb 2023 15:00  Patient On (Oxygen Delivery Method): nasal cannula w/ humidification  O2 Flow (L/min): 2    I/Os:   I&O's Detail    08 Feb 2023 07:01  -  08 Feb 2023 16:21  --------------------------------------------------------  IN:    Enteral Tube Flush: 60 mL    Lactated Ringers: 500 mL    Lactated Ringers: 125 mL  Total IN: 685 mL    OUT:    Voided (mL): 500 mL  Total OUT: 500 mL    Total NET: 185 mL              MEDICATIONS:  MEDICATIONS  (STANDING):  acetaminophen   IVPB .. 1000 milliGRAM(s) IV Intermittent every 6 hours  cefoTEtan  IVPB 2 Gram(s) IV Intermittent every 12 hours  dextrose 5%. 1000 milliLiter(s) (100 mL/Hr) IV Continuous <Continuous>  dextrose 5%. 1000 milliLiter(s) (50 mL/Hr) IV Continuous <Continuous>  dextrose 50% Injectable 25 Gram(s) IV Push once  dextrose 50% Injectable 12.5 Gram(s) IV Push once  dextrose 50% Injectable 25 Gram(s) IV Push once  glucagon  Injectable 1 milliGRAM(s) IntraMuscular once  heparin   Injectable 5000 Unit(s) SubCutaneous every 8 hours  HYDROmorphone PCA (1 mG/mL) 30 milliLiter(s) PCA Continuous PCA Continuous  insulin lispro (ADMELOG) corrective regimen sliding scale   SubCutaneous every 6 hours  lactated ringers. 1000 milliLiter(s) (125 mL/Hr) IV Continuous <Continuous>  pantoprazole  Injectable 40 milliGRAM(s) IV Push every 12 hours  sodium chloride 0.9% lock flush 3 milliLiter(s) IV Push every 8 hours    MEDICATIONS  (PRN):  dextrose Oral Gel 15 Gram(s) Oral once PRN Blood Glucose LESS THAN 70 milliGRAM(s)/deciliter  HYDROmorphone PCA (1 mG/mL) Rescue Clinician Bolus 0.5 milliGRAM(s) IV Push every 15 minutes PRN for Pain Scale GREATER THAN 6  naloxone Injectable 0.1 milliGRAM(s) IV Push every 3 minutes PRN For ANY of the following changes in patient status:  A. RR LESS THAN 10 breaths per minute, B. Oxygen saturation LESS THAN 90%, C. Sedation score of 6  ondansetron Injectable 4 milliGRAM(s) IV Push every 6 hours PRN Nausea      LABS:  Laboratory data was independently reviewed by me today.                       RADIOLOGY:   Radiology images were independently reviewed by me today. Reports were reviewed by me today.

## 2023-02-08 NOTE — BRIEF OPERATIVE NOTE - NSICDXBRIEFPROCEDURE_GEN_ALL_CORE_FT
PROCEDURES:  Resection of gastrointestinal stromal tumor (GIST) 08-Feb-2023 11:23:23  Maris Del Angel  Robot-assisted laparoscopic surgical removal of lesion of stomach 08-Feb-2023 11:24:55  Maris Del Angel  Creation of intra-abdominal omental flap 08-Feb-2023 11:25:15  Maris Del Angel  Repair of umbilical hernia with lipectomy 08-Feb-2023 11:26:29  Maris Del Angel

## 2023-02-09 ENCOUNTER — TRANSCRIPTION ENCOUNTER (OUTPATIENT)
Age: 48
End: 2023-02-09

## 2023-02-09 LAB
ANION GAP SERPL CALC-SCNC: 11 MMOL/L — SIGNIFICANT CHANGE UP (ref 7–14)
BLD GP AB SCN SERPL QL: NEGATIVE — SIGNIFICANT CHANGE UP
BUN SERPL-MCNC: 14 MG/DL — SIGNIFICANT CHANGE UP (ref 7–23)
CALCIUM SERPL-MCNC: 8.7 MG/DL — SIGNIFICANT CHANGE UP (ref 8.4–10.5)
CHLORIDE SERPL-SCNC: 100 MMOL/L — SIGNIFICANT CHANGE UP (ref 98–107)
CO2 SERPL-SCNC: 26 MMOL/L — SIGNIFICANT CHANGE UP (ref 22–31)
CREAT SERPL-MCNC: 0.81 MG/DL — SIGNIFICANT CHANGE UP (ref 0.5–1.3)
EGFR: 109 ML/MIN/1.73M2 — SIGNIFICANT CHANGE UP
GLUCOSE BLDC GLUCOMTR-MCNC: 143 MG/DL — HIGH (ref 70–99)
GLUCOSE BLDC GLUCOMTR-MCNC: 161 MG/DL — HIGH (ref 70–99)
GLUCOSE BLDC GLUCOMTR-MCNC: 162 MG/DL — HIGH (ref 70–99)
GLUCOSE BLDC GLUCOMTR-MCNC: 220 MG/DL — HIGH (ref 70–99)
GLUCOSE SERPL-MCNC: 173 MG/DL — HIGH (ref 70–99)
HCT VFR BLD CALC: 42.2 % — SIGNIFICANT CHANGE UP (ref 39–50)
HGB BLD-MCNC: 13.6 G/DL — SIGNIFICANT CHANGE UP (ref 13–17)
MAGNESIUM SERPL-MCNC: 2 MG/DL — SIGNIFICANT CHANGE UP (ref 1.6–2.6)
MCHC RBC-ENTMCNC: 27.9 PG — SIGNIFICANT CHANGE UP (ref 27–34)
MCHC RBC-ENTMCNC: 32.2 GM/DL — SIGNIFICANT CHANGE UP (ref 32–36)
MCV RBC AUTO: 86.5 FL — SIGNIFICANT CHANGE UP (ref 80–100)
NRBC # BLD: 0 /100 WBCS — SIGNIFICANT CHANGE UP (ref 0–0)
NRBC # FLD: 0 K/UL — SIGNIFICANT CHANGE UP (ref 0–0)
PHOSPHATE SERPL-MCNC: 4.3 MG/DL — SIGNIFICANT CHANGE UP (ref 2.5–4.5)
PLATELET # BLD AUTO: 255 K/UL — SIGNIFICANT CHANGE UP (ref 150–400)
POTASSIUM SERPL-MCNC: 3.9 MMOL/L — SIGNIFICANT CHANGE UP (ref 3.5–5.3)
POTASSIUM SERPL-SCNC: 3.9 MMOL/L — SIGNIFICANT CHANGE UP (ref 3.5–5.3)
RBC # BLD: 4.88 M/UL — SIGNIFICANT CHANGE UP (ref 4.2–5.8)
RBC # FLD: 14.2 % — SIGNIFICANT CHANGE UP (ref 10.3–14.5)
RH IG SCN BLD-IMP: NEGATIVE — SIGNIFICANT CHANGE UP
SODIUM SERPL-SCNC: 137 MMOL/L — SIGNIFICANT CHANGE UP (ref 135–145)
WBC # BLD: 11.54 K/UL — HIGH (ref 3.8–10.5)
WBC # FLD AUTO: 11.54 K/UL — HIGH (ref 3.8–10.5)

## 2023-02-09 PROCEDURE — 71045 X-RAY EXAM CHEST 1 VIEW: CPT | Mod: 26

## 2023-02-09 PROCEDURE — 99233 SBSQ HOSP IP/OBS HIGH 50: CPT

## 2023-02-09 RX ORDER — POTASSIUM CHLORIDE 20 MEQ
10 PACKET (EA) ORAL ONCE
Refills: 0 | Status: COMPLETED | OUTPATIENT
Start: 2023-02-09 | End: 2023-02-09

## 2023-02-09 RX ORDER — ACETAMINOPHEN 500 MG
1000 TABLET ORAL ONCE
Refills: 0 | Status: COMPLETED | OUTPATIENT
Start: 2023-02-09 | End: 2023-02-09

## 2023-02-09 RX ADMIN — Medication 2: at 07:53

## 2023-02-09 RX ADMIN — SODIUM CHLORIDE 3 MILLILITER(S): 9 INJECTION INTRAMUSCULAR; INTRAVENOUS; SUBCUTANEOUS at 21:51

## 2023-02-09 RX ADMIN — Medication 2: at 12:02

## 2023-02-09 RX ADMIN — Medication 100 MILLIEQUIVALENT(S): at 04:50

## 2023-02-09 RX ADMIN — Medication 400 MILLIGRAM(S): at 18:27

## 2023-02-09 RX ADMIN — HEPARIN SODIUM 5000 UNIT(S): 5000 INJECTION INTRAVENOUS; SUBCUTANEOUS at 05:54

## 2023-02-09 RX ADMIN — Medication 0.5 MILLIGRAM(S): at 07:53

## 2023-02-09 RX ADMIN — HYDROMORPHONE HYDROCHLORIDE 30 MILLILITER(S): 2 INJECTION INTRAMUSCULAR; INTRAVENOUS; SUBCUTANEOUS at 21:44

## 2023-02-09 RX ADMIN — Medication 1000 MILLIGRAM(S): at 18:50

## 2023-02-09 RX ADMIN — PANTOPRAZOLE SODIUM 40 MILLIGRAM(S): 20 TABLET, DELAYED RELEASE ORAL at 17:36

## 2023-02-09 RX ADMIN — HEPARIN SODIUM 5000 UNIT(S): 5000 INJECTION INTRAVENOUS; SUBCUTANEOUS at 21:30

## 2023-02-09 RX ADMIN — Medication 400 MILLIGRAM(S): at 05:56

## 2023-02-09 RX ADMIN — HEPARIN SODIUM 5000 UNIT(S): 5000 INJECTION INTRAVENOUS; SUBCUTANEOUS at 14:41

## 2023-02-09 RX ADMIN — SODIUM CHLORIDE 3 MILLILITER(S): 9 INJECTION INTRAMUSCULAR; INTRAVENOUS; SUBCUTANEOUS at 06:14

## 2023-02-09 RX ADMIN — Medication 100 GRAM(S): at 05:54

## 2023-02-09 RX ADMIN — Medication 0.5 MILLIGRAM(S): at 00:28

## 2023-02-09 RX ADMIN — Medication 400 MILLIGRAM(S): at 00:00

## 2023-02-09 RX ADMIN — PANTOPRAZOLE SODIUM 40 MILLIGRAM(S): 20 TABLET, DELAYED RELEASE ORAL at 05:53

## 2023-02-09 RX ADMIN — SODIUM CHLORIDE 3 MILLILITER(S): 9 INJECTION INTRAMUSCULAR; INTRAVENOUS; SUBCUTANEOUS at 14:28

## 2023-02-09 NOTE — PHYSICAL THERAPY INITIAL EVALUATION ADULT - ADDITIONAL COMMENTS
Patient lives with wife and children in private home, flight to bedroom/bathroom. Patient was independent in all ADL prior to admission. patient was not using an assistive device prior to admission.

## 2023-02-09 NOTE — PHYSICAL THERAPY INITIAL EVALUATION ADULT - PERTINENT HX OF CURRENT PROBLEM, REHAB EVAL
47 year old male with recent hospitalization in 12/2022 for upper GI bleed, s/p transfusion 1 units PRBC. 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 Pt presents today for presurgical evaluation for Robotic Esophagogastroduodenoscopy, Laparoscopic Resection of Gastrointestinal Stromal Tumor (GIST), Possible Esophagogastrectomy.

## 2023-02-09 NOTE — PHYSICAL THERAPY INITIAL EVALUATION ADULT - DID THE PATIENT HAVE SURGERY?
Resection of gastrointestinal stromal tumor (GIST), Robot-assisted laparoscopic surgical removal of lesion of stomach, Creation of intra-abdominal omental flap/yes

## 2023-02-09 NOTE — PROGRESS NOTE ADULT - SUBJECTIVE AND OBJECTIVE BOX
Anesthesia Pain Management Service    SUBJECTIVE: Patient is doing well with IV PCA and no significant problems reported.    Pain Scale Score	At rest: _3/10__ 	With Activity: _6/10_ 	[X ] Refer to charted pain scores    THERAPY:    [ ] IV PCA Morphine		[ ] 5 mg/mL	[ ] 1 mg/mL  [X ] IV PCA Hydromorphone	[ ] 5 mg/mL	[X ] 1 mg/mL  [ ] IV PCA Fentanyl		[ ] 50 micrograms/mL    Demand dose __0.2_ lockout __6_ (minutes) Continuous Rate _0__ Total: 0.7mg used (in past 24 hours)      MEDICATIONS  (STANDING):  acetaminophen   IVPB .. 1000 milliGRAM(s) IV Intermittent once  dextrose 5%. 1000 milliLiter(s) (100 mL/Hr) IV Continuous <Continuous>  dextrose 5%. 1000 milliLiter(s) (50 mL/Hr) IV Continuous <Continuous>  dextrose 50% Injectable 25 Gram(s) IV Push once  dextrose 50% Injectable 12.5 Gram(s) IV Push once  dextrose 50% Injectable 25 Gram(s) IV Push once  glucagon  Injectable 1 milliGRAM(s) IntraMuscular once  heparin   Injectable 5000 Unit(s) SubCutaneous every 8 hours  HYDROmorphone PCA (1 mG/mL) 30 milliLiter(s) PCA Continuous PCA Continuous  insulin lispro (ADMELOG) corrective regimen sliding scale   SubCutaneous every 6 hours  lactated ringers. 1000 milliLiter(s) (125 mL/Hr) IV Continuous <Continuous>  pantoprazole  Injectable 40 milliGRAM(s) IV Push every 12 hours  sodium chloride 0.9% lock flush 3 milliLiter(s) IV Push every 8 hours    MEDICATIONS  (PRN):  dextrose Oral Gel 15 Gram(s) Oral once PRN Blood Glucose LESS THAN 70 milliGRAM(s)/deciliter  HYDROmorphone PCA (1 mG/mL) Rescue Clinician Bolus 0.5 milliGRAM(s) IV Push every 15 minutes PRN for Pain Scale GREATER THAN 6  naloxone Injectable 0.1 milliGRAM(s) IV Push every 3 minutes PRN For ANY of the following changes in patient status:  A. RR LESS THAN 10 breaths per minute, B. Oxygen saturation LESS THAN 90%, C. Sedation score of 6  ondansetron Injectable 4 milliGRAM(s) IV Push every 6 hours PRN Nausea      OBJECTIVE: Patient sitting up in chair.    Sedation Score:	[ X] Alert	[ ] Drowsy 	[ ] Arousable	[ ] Asleep	[ ] Unresponsive    Side Effects:	[X ] None	[ ] Nausea	[ ] Vomiting	[ ] Pruritus  		[ ] Other:    Vital Signs Last 24 Hrs  T(C): 36.8 (09 Feb 2023 04:00), Max: 37 (08 Feb 2023 20:00)  T(F): 98.2 (09 Feb 2023 04:00), Max: 98.6 (08 Feb 2023 20:00)  HR: 76 (09 Feb 2023 08:00) (58 - 91)  BP: 160/98 (09 Feb 2023 08:00) (123/72 - 173/91)  BP(mean): 117 (09 Feb 2023 08:00) (80 - 117)  RR: 20 (09 Feb 2023 08:00) (17 - 26)  SpO2: 99% (09 Feb 2023 08:00) (96% - 100%)    Parameters below as of 09 Feb 2023 08:00  Patient On (Oxygen Delivery Method): room air        ASSESSMENT/ PLAN    Therapy to  be:	[ X] Continue   [ ] Discontinued   [ ] Change to prn Analgesics    Documentation and Verification of current medications:   [X] Done	[ ] Not done, not elligible    Comments: Continue IV PCA. Recommend non-opioid adjuvant analgesics to be used when possible and when allowed by primary surgical team.    Progress Note written now but Patient was seen earlier.

## 2023-02-09 NOTE — PROGRESS NOTE ADULT - SUBJECTIVE AND OBJECTIVE BOX
SATISH ACEVEDO                     [id number]    HPI:  47 year old male with recent hospitalization in 12/2022 for upper GI bleed, s/p transfusion 1 units PRBC. 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 Pt presents today for presurgical evaluation for Robotic Esophagogastroduodenoscopy, Laparoscopic Resection of Gastrointestinal Stromal Tumor (GIST), Possible Esophagogastrectomy.  (30 Jan 2023 14:19)    CHIEF COMPLAINT: FOLLOW UP IN ICU FOR POSTOPERATIVE CARE OF PATIENT WHO IS S/P GIST tumor resection      PROCEDURES:                     Robot-assisted laparoscopic surgical removal of lesion of stomach, creation of omental flap over repair, repair of umbilical hernia with lipectomy 08-Feb-2023                       ISSUES:     GIST tumor  Postoperative pain  DM  Hyperlipidemia  GI bleed      PAST MEDICAL & SURGICAL HISTORY:  DM (diabetes mellitus)      High cholesterol      GI bleed      History of esophagogastroduodenoscopy (EGD)          VITAL SIGNS:  Vital Signs Last 24 Hrs  T(C): 36.8 (09 Feb 2023 04:00), Max: 37 (08 Feb 2023 20:00)  T(F): 98.2 (09 Feb 2023 04:00), Max: 98.6 (08 Feb 2023 20:00)  HR: 61 (09 Feb 2023 09:00) (58 - 91)  BP: 150/86 (09 Feb 2023 09:00) (123/72 - 173/91)  BP(mean): 106 (09 Feb 2023 09:00) (80 - 117)  RR: 18 (09 Feb 2023 09:00) (17 - 26)  SpO2: 95% (09 Feb 2023 09:00) (95% - 100%)    Parameters below as of 09 Feb 2023 09:00  Patient On (Oxygen Delivery Method): room air        I/Os:   I&O's Detail    08 Feb 2023 07:01  -  09 Feb 2023 07:00  --------------------------------------------------------  IN:    Enteral Tube Flush: 150 mL    IV PiggyBack: 500 mL    Lactated Ringers: 2375 mL    Lactated Ringers: 125 mL  Total IN: 3150 mL    OUT:    Nasogastric/Oral tube (mL): 200 mL    Voided (mL): 2250 mL  Total OUT: 2450 mL    Total NET: 700 mL      09 Feb 2023 07:01  -  09 Feb 2023 10:38  --------------------------------------------------------  IN:    Enteral Tube Flush: 30 mL    Lactated Ringers: 125 mL  Total IN: 155 mL    OUT:    Voided (mL): 350 mL  Total OUT: 350 mL    Total NET: -195 mL          CAPILLARY BLOOD GLUCOSE      POCT Blood Glucose.: 162 mg/dL (09 Feb 2023 07:17)  POCT Blood Glucose.: 165 mg/dL (08 Feb 2023 23:53)  POCT Blood Glucose.: 205 mg/dL (08 Feb 2023 18:05)  POCT Blood Glucose.: 311 mg/dL (08 Feb 2023 12:36)      =======================MEDICATIONS===================  MEDICATIONS  (STANDING):  acetaminophen   IVPB .. 1000 milliGRAM(s) IV Intermittent once  dextrose 5%. 1000 milliLiter(s) (100 mL/Hr) IV Continuous <Continuous>  dextrose 5%. 1000 milliLiter(s) (50 mL/Hr) IV Continuous <Continuous>  dextrose 50% Injectable 25 Gram(s) IV Push once  dextrose 50% Injectable 12.5 Gram(s) IV Push once  dextrose 50% Injectable 25 Gram(s) IV Push once  glucagon  Injectable 1 milliGRAM(s) IntraMuscular once  heparin   Injectable 5000 Unit(s) SubCutaneous every 8 hours  HYDROmorphone PCA (1 mG/mL) 30 milliLiter(s) PCA Continuous PCA Continuous  insulin lispro (ADMELOG) corrective regimen sliding scale   SubCutaneous every 6 hours  lactated ringers. 1000 milliLiter(s) (125 mL/Hr) IV Continuous <Continuous>  pantoprazole  Injectable 40 milliGRAM(s) IV Push every 12 hours  sodium chloride 0.9% lock flush 3 milliLiter(s) IV Push every 8 hours    MEDICATIONS  (PRN):  dextrose Oral Gel 15 Gram(s) Oral once PRN Blood Glucose LESS THAN 70 milliGRAM(s)/deciliter  HYDROmorphone PCA (1 mG/mL) Rescue Clinician Bolus 0.5 milliGRAM(s) IV Push every 15 minutes PRN for Pain Scale GREATER THAN 6  naloxone Injectable 0.1 milliGRAM(s) IV Push every 3 minutes PRN For ANY of the following changes in patient status:  A. RR LESS THAN 10 breaths per minute, B. Oxygen saturation LESS THAN 90%, C. Sedation score of 6  ondansetron Injectable 4 milliGRAM(s) IV Push every 6 hours PRN Nausea      PHYSICAL EXAM============================  General:                         Awake, alert, not in any distress  Neuro:                            Moving all extremities to commands.   Respiratory:	Air entry fair and  bilateral conducted sounds                                           Effort even and unlabored.  CV:		Regular rate and rhythm. Normal S1/S2                                          Distal pulses present.  Abdomen:	                     Soft, non-distended. Bowel sounds present   Skin:		No rash.  Extremities:	Warm, no cyanosis or edema.  Palpable pulses    ============================LABS=========================                        13.6   11.54 )-----------( 255      ( 09 Feb 2023 03:05 )             42.2     02-09    137  |  100  |  14  ----------------------------<  173<H>  3.9   |  26  |  0.81    Ca    8.7      09 Feb 2023 03:05  Phos  4.3     02-09  Mg     2.00     02-09        ASSESSMENT AND PLAN:   NEURO:  Post-operative Pain - Stable. Pain control with PCA and Tylenol IV PRN.         RESPIRATORY:  Chest Pt, C/W Bronchodilators       CARDIOVASCULAR:  Hemodynamically stable - Not on pressors. Continue hemodynamic monitoring.  Telemetry (medical test) - Reviewed by me today independently. Normal sinus rhythm.       RENAL:    Stable - Monitor IOs and electrolytes. Keep K above 4.0 and Mg above 2.0. Maintenance IVF.        GASTROINTESTINAL:    GI prophylaxis PPI Q12h  Zofran and Reglan IV PRN for nausea  NPO, IVF  NGT to low continuous suction          HEMATOLOGIC:    No signs of active bleeding. Monitor Hgb in CBC in AM  DVT prophylaxis with heparin subQ and SCDs.       INFECTIOUS DISEASE:    All surgical sites appear clean. No signs of active infection. Will monitor for fever and leukocytosis.         ENDOCRINE:    Stable – Monitor glucose fingersticks for goal 120-180. Lispro SS          ONCOLOGY:  GIST tumor - Improved. S/P resection. Follow up final pathology.      Pertinent clinical, laboratory, radiographic, hemodynamic, echocardiographic, respiratory data, microbiologic data and chart were reviewed by myself and analyzed frequently throughout the course of the day and night by myself.    Plan discussed at length with the CTICU staff and Attending CT Surgeon -   Dr Jake Carver.      Patient's status was discussed with patient at bedside.      Malcolm BRIANP

## 2023-02-09 NOTE — DIETITIAN INITIAL EVALUATION ADULT - PERTINENT LABORATORY DATA
02-09    137  |  100  |  14  ----------------------------<  173<H>  3.9   |  26  |  0.81    Ca    8.7      09 Feb 2023 03:05  Phos  4.3     02-09  Mg     2.00     02-09    POCT Blood Glucose.: 161 mg/dL (02-09-23 @ 12:01)  A1C with Estimated Average Glucose Result: 7.1 % (01-30-23 @ 16:30)  A1C with Estimated Average Glucose Result: 8.9 % (12-24-22 @ 06:45)

## 2023-02-09 NOTE — DIETITIAN INITIAL EVALUATION ADULT - PERTINENT MEDS FT
MEDICATIONS  (STANDING):  acetaminophen   IVPB .. 1000 milliGRAM(s) IV Intermittent once  dextrose 5%. 1000 milliLiter(s) (100 mL/Hr) IV Continuous <Continuous>  dextrose 5%. 1000 milliLiter(s) (50 mL/Hr) IV Continuous <Continuous>  dextrose 50% Injectable 25 Gram(s) IV Push once  dextrose 50% Injectable 12.5 Gram(s) IV Push once  dextrose 50% Injectable 25 Gram(s) IV Push once  glucagon  Injectable 1 milliGRAM(s) IntraMuscular once  heparin   Injectable 5000 Unit(s) SubCutaneous every 8 hours  HYDROmorphone PCA (1 mG/mL) 30 milliLiter(s) PCA Continuous PCA Continuous  insulin lispro (ADMELOG) corrective regimen sliding scale   SubCutaneous every 6 hours  lactated ringers. 1000 milliLiter(s) (125 mL/Hr) IV Continuous <Continuous>  pantoprazole  Injectable 40 milliGRAM(s) IV Push every 12 hours  sodium chloride 0.9% lock flush 3 milliLiter(s) IV Push every 8 hours    MEDICATIONS  (PRN):  dextrose Oral Gel 15 Gram(s) Oral once PRN Blood Glucose LESS THAN 70 milliGRAM(s)/deciliter  HYDROmorphone PCA (1 mG/mL) Rescue Clinician Bolus 0.5 milliGRAM(s) IV Push every 15 minutes PRN for Pain Scale GREATER THAN 6  naloxone Injectable 0.1 milliGRAM(s) IV Push every 3 minutes PRN For ANY of the following changes in patient status:  A. RR LESS THAN 10 breaths per minute, B. Oxygen saturation LESS THAN 90%, C. Sedation score of 6  ondansetron Injectable 4 milliGRAM(s) IV Push every 6 hours PRN Nausea

## 2023-02-09 NOTE — DIETITIAN INITIAL EVALUATION ADULT - ADD RECOMMEND
1. Suggest advancing diet to DASH/TLC, Consistent Carbohydrate. 2. Monitor weights, labs, BM's, skin integrity. 3. Follow pt as per protocol.

## 2023-02-09 NOTE — PROGRESS NOTE ADULT - SUBJECTIVE AND OBJECTIVE BOX
ANESTHESIA POSTOP CHECK    47y Male POSTOP DAY 1 S/P   [x ] General Anesthesia  [ ] Corey Anesthesia  [ ] MAC    Vital Signs Last 24 Hrs  T(C): 37.1 (09 Feb 2023 16:00), Max: 37.1 (09 Feb 2023 16:00)  T(F): 98.7 (09 Feb 2023 16:00), Max: 98.7 (09 Feb 2023 16:00)  HR: 60 (09 Feb 2023 16:00) (58 - 91)  BP: 153/81 (09 Feb 2023 16:00) (123/72 - 160/98)  BP(mean): 103 (09 Feb 2023 16:00) (80 - 117)  RR: 15 (09 Feb 2023 16:00) (14 - 23)  SpO2: 99% (09 Feb 2023 16:00) (95% - 100%)    Parameters below as of 09 Feb 2023 16:00  Patient On (Oxygen Delivery Method): room air      I&O's Summary    08 Feb 2023 07:01  -  09 Feb 2023 07:00  --------------------------------------------------------  IN: 3150 mL / OUT: 2450 mL / NET: 700 mL    09 Feb 2023 07:01  -  09 Feb 2023 16:49  --------------------------------------------------------  IN: 1155 mL / OUT: 850 mL / NET: 305 mL        [x ] NO APPARENT ANESTHESIA COMPLICATIONS      Comments:

## 2023-02-09 NOTE — PROGRESS NOTE ADULT - SUBJECTIVE AND OBJECTIVE BOX
Day __1_ of Anesthesia Pain Management Service    SUBJECTIVE:    Therapy:	  [x ] IV PCA	   [ ] Epidural           [ ] s/p Spinal Opoid              [ ] Postpartum infusion	  [ ] Patient controlled regional anesthesia (PCRA)    [ ] prn Analgesics    OBJECTIVE:   [ x] No new signs     [ ] Other:    Side Effects:  [x ] None			[ ] Other:    Assessment of Catheter Site:		[ ] Intact		[ ] Other:    ASSESSMENT/PLAN  [x ] Continue current therapy    [ ] Therapy changed to:    [ ] IV PCA       [ ] Epidural     [ ] prn Analgesics     Comments:

## 2023-02-10 ENCOUNTER — TRANSCRIPTION ENCOUNTER (OUTPATIENT)
Age: 48
End: 2023-02-10

## 2023-02-10 VITALS
TEMPERATURE: 99 F | OXYGEN SATURATION: 98 % | RESPIRATION RATE: 16 BRPM | SYSTOLIC BLOOD PRESSURE: 137 MMHG | DIASTOLIC BLOOD PRESSURE: 78 MMHG | HEART RATE: 58 BPM

## 2023-02-10 LAB
ANION GAP SERPL CALC-SCNC: 11 MMOL/L — SIGNIFICANT CHANGE UP (ref 7–14)
BUN SERPL-MCNC: 13 MG/DL — SIGNIFICANT CHANGE UP (ref 7–23)
CALCIUM SERPL-MCNC: 9.2 MG/DL — SIGNIFICANT CHANGE UP (ref 8.4–10.5)
CHLORIDE SERPL-SCNC: 100 MMOL/L — SIGNIFICANT CHANGE UP (ref 98–107)
CO2 SERPL-SCNC: 26 MMOL/L — SIGNIFICANT CHANGE UP (ref 22–31)
CREAT SERPL-MCNC: 0.88 MG/DL — SIGNIFICANT CHANGE UP (ref 0.5–1.3)
EGFR: 106 ML/MIN/1.73M2 — SIGNIFICANT CHANGE UP
GLUCOSE BLDC GLUCOMTR-MCNC: 139 MG/DL — HIGH (ref 70–99)
GLUCOSE BLDC GLUCOMTR-MCNC: 142 MG/DL — HIGH (ref 70–99)
GLUCOSE BLDC GLUCOMTR-MCNC: 147 MG/DL — HIGH (ref 70–99)
GLUCOSE SERPL-MCNC: 164 MG/DL — HIGH (ref 70–99)
HCT VFR BLD CALC: 42.1 % — SIGNIFICANT CHANGE UP (ref 39–50)
HGB BLD-MCNC: 13.2 G/DL — SIGNIFICANT CHANGE UP (ref 13–17)
MAGNESIUM SERPL-MCNC: 2 MG/DL — SIGNIFICANT CHANGE UP (ref 1.6–2.6)
MCHC RBC-ENTMCNC: 27.4 PG — SIGNIFICANT CHANGE UP (ref 27–34)
MCHC RBC-ENTMCNC: 31.4 GM/DL — LOW (ref 32–36)
MCV RBC AUTO: 87.5 FL — SIGNIFICANT CHANGE UP (ref 80–100)
NRBC # BLD: 0 /100 WBCS — SIGNIFICANT CHANGE UP (ref 0–0)
NRBC # FLD: 0 K/UL — SIGNIFICANT CHANGE UP (ref 0–0)
PHOSPHATE SERPL-MCNC: 3.1 MG/DL — SIGNIFICANT CHANGE UP (ref 2.5–4.5)
PLATELET # BLD AUTO: 236 K/UL — SIGNIFICANT CHANGE UP (ref 150–400)
POTASSIUM SERPL-MCNC: 4.1 MMOL/L — SIGNIFICANT CHANGE UP (ref 3.5–5.3)
POTASSIUM SERPL-SCNC: 4.1 MMOL/L — SIGNIFICANT CHANGE UP (ref 3.5–5.3)
RBC # BLD: 4.81 M/UL — SIGNIFICANT CHANGE UP (ref 4.2–5.8)
RBC # FLD: 14 % — SIGNIFICANT CHANGE UP (ref 10.3–14.5)
SODIUM SERPL-SCNC: 137 MMOL/L — SIGNIFICANT CHANGE UP (ref 135–145)
WBC # BLD: 7.73 K/UL — SIGNIFICANT CHANGE UP (ref 3.8–10.5)
WBC # FLD AUTO: 7.73 K/UL — SIGNIFICANT CHANGE UP (ref 3.8–10.5)

## 2023-02-10 PROCEDURE — 71045 X-RAY EXAM CHEST 1 VIEW: CPT | Mod: 26

## 2023-02-10 PROCEDURE — 74220 X-RAY XM ESOPHAGUS 1CNTRST: CPT | Mod: 26

## 2023-02-10 RX ORDER — ACETAMINOPHEN 500 MG
2 TABLET ORAL
Qty: 0 | Refills: 0 | DISCHARGE

## 2023-02-10 RX ORDER — OXYCODONE HYDROCHLORIDE 5 MG/1
1 TABLET ORAL
Qty: 30 | Refills: 0
Start: 2023-02-10 | End: 2023-02-14

## 2023-02-10 RX ADMIN — SODIUM CHLORIDE 125 MILLILITER(S): 9 INJECTION, SOLUTION INTRAVENOUS at 07:11

## 2023-02-10 RX ADMIN — HEPARIN SODIUM 5000 UNIT(S): 5000 INJECTION INTRAVENOUS; SUBCUTANEOUS at 06:35

## 2023-02-10 RX ADMIN — SODIUM CHLORIDE 3 MILLILITER(S): 9 INJECTION INTRAMUSCULAR; INTRAVENOUS; SUBCUTANEOUS at 14:58

## 2023-02-10 RX ADMIN — PANTOPRAZOLE SODIUM 40 MILLIGRAM(S): 20 TABLET, DELAYED RELEASE ORAL at 06:35

## 2023-02-10 RX ADMIN — HEPARIN SODIUM 5000 UNIT(S): 5000 INJECTION INTRAVENOUS; SUBCUTANEOUS at 14:59

## 2023-02-10 RX ADMIN — SODIUM CHLORIDE 3 MILLILITER(S): 9 INJECTION INTRAMUSCULAR; INTRAVENOUS; SUBCUTANEOUS at 06:46

## 2023-02-10 RX ADMIN — HYDROMORPHONE HYDROCHLORIDE 30 MILLILITER(S): 2 INJECTION INTRAMUSCULAR; INTRAVENOUS; SUBCUTANEOUS at 07:09

## 2023-02-10 NOTE — DISCHARGE NOTE NURSING/CASE MANAGEMENT/SOCIAL WORK - NSDCPEFALRISK_GEN_ALL_CORE
For information on Fall & Injury Prevention, visit: [email or web address] OR  [email or web address] OR  https://www.cdc.gov/steadi/patient.html

## 2023-02-10 NOTE — DISCHARGE NOTE PROVIDER - NSDCCPTREATMENT_GEN_ALL_CORE_FT
PRINCIPAL PROCEDURE  Procedure: Resection of gastrointestinal stromal tumor (GIST)  Findings and Treatment:       SECONDARY PROCEDURE  Procedure: Creation of intra-abdominal omental flap  Findings and Treatment:     Procedure: Robot-assisted laparoscopic surgical removal of lesion of stomach  Findings and Treatment:

## 2023-02-10 NOTE — DISCHARGE NOTE PROVIDER - HOSPITAL COURSE
47 year old male with recent hospitalization in 12/2022 for upper GI bleed, during which he had a transfusion of 1 unit PRBC. Pt had CT scan and biopsy which revealed a GIST. Pt underwent an EGD, Robotic Esophagogastroduodenoscopy, Robotic Laparoscopic Resection of Gastrointestinal Stromal Tumor (GIST), and umbilical hernia repair on 2/8/23.  His NGT was removed on 2/9 and he was for discharge home 2/10 after a barium swallow. 47 year old male with recent hospitalization in 12/2022 for upper GI bleed, during which he had a transfusion of 1 unit PRBC. Pt had CT scan and biopsy which revealed a GIST. Pt underwent an EGD, Robotic Esophagogastroduodenoscopy, Robotic Laparoscopic Resection of Gastrointestinal Stromal Tumor (GIST), and umbilical hernia repair on 2/8/23.  His NGT was removed on 2/9 and he was for discharge home 2/10 after a barium swallow and tolerating a clear liquid diet.    Vital Signs Last 24 Hrs  T(C): 37.1 (10 Feb 2023 13:37), Max: 37.5 (09 Feb 2023 20:00)  T(F): 98.8 (10 Feb 2023 13:37), Max: 99.5 (09 Feb 2023 20:00)  HR: 58 (10 Feb 2023 13:37) (54 - 64)  BP: 137/78 (10 Feb 2023 13:37) (131/79 - 157/93)  BP(mean): 109 (09 Feb 2023 20:00) (95 - 109)  RR: 16 (10 Feb 2023 13:37) (14 - 18)  SpO2: 98% (10 Feb 2023 13:37) (95% - 100%)    Parameters below as of 10 Feb 2023 13:37  Patient On (Oxygen Delivery Method): room air        General: A&Ox3, NAD  HEENT: Normocephalic. Vision and hearing grossly intact, EOMI. Airway grossly patent, no stridor.  CV: S1S2, RRR, well perfused  Resp: Breathing comfortably, no resp distress, no accessory muscle use  GI: Soft, NT, ND, +BS  MSK: VEENA x4  Pysch: Appropriate affect  Incisions: c/d/i                          13.2   7.73  )-----------( 236      ( 10 Feb 2023 05:42 )             42.1       02-10    137  |  100  |  13  ----------------------------<  164<H>  4.1   |  26  |  0.88    Ca    9.2      10 Feb 2023 05:42  Phos  3.1     02-10  Mg     2.00     02-10

## 2023-02-10 NOTE — DISCHARGE NOTE PROVIDER - NSDCMRMEDTOKEN_GEN_ALL_CORE_FT
atorvastatin 40 mg oral tablet: 1 tab(s) orally once a day  ferrous sulfate: 1 tab(s) orally once a day  Ozempic (1 mg dose) 4 mg/3 mL subcutaneous solution: 0.75 milliliter(s) subcutaneous once a week on Thursday  pantoprazole 40 mg oral delayed release tablet: 1 tab(s) orally once a day  Vitamin D3: 1 tab(s) orally once a day   atorvastatin 40 mg oral tablet: 1 tab(s) orally once a day  ferrous sulfate: 1 tab(s) orally once a day  Ozempic (1 mg dose) 4 mg/3 mL subcutaneous solution: 0.75 milliliter(s) subcutaneous once a week on Thursday  pantoprazole 40 mg oral delayed release tablet: 1 tab(s) orally once a day  Tylenol 325 mg oral tablet: 2 tab(s) orally every 6 hours, As Needed  Vitamin D3: 1 tab(s) orally once a day   atorvastatin 40 mg oral tablet: 1 tab(s) orally once a day  Chest Xray PA/Lat: PA/Lat Chest Xray  Dx: s/p Laproscopic resection of GIST tumor near GE junction, umbilical hernia repair. ICD10: C49. A   ferrous sulfate: 1 tab(s) orally once a day  oxyCODONE 5 mg oral tablet: 1 tab(s) orally every 4 hours, As Needed -for moderate pain - for severe pain MDD:6   Ozempic (1 mg dose) 4 mg/3 mL subcutaneous solution: 0.75 milliliter(s) subcutaneous once a week on Thursday  pantoprazole 40 mg oral delayed release tablet: 1 tab(s) orally once a day  Tylenol 325 mg oral tablet: 2 tab(s) orally every 6 hours, As Needed  Vitamin D3: 1 tab(s) orally once a day

## 2023-02-10 NOTE — PROGRESS NOTE ADULT - SUBJECTIVE AND OBJECTIVE BOX
Anesthesia Pain Management Service- Attending Addendum    SUBJECTIVE: Pt doing well with IV PCA without problems reported.    Therapy:	  [ X] IV PCA	   [ ] Epidural           [ ] s/p Spinal Opoid              [ ] Postpartum infusion	  [ ] Patient controlled regional anesthesia (PCRA)    [ ] prn Analgesics    Allergies    No Known Allergies    Intolerances      MEDICATIONS  (STANDING):  acetaminophen   IVPB .. 1000 milliGRAM(s) IV Intermittent once  dextrose 5%. 1000 milliLiter(s) (50 mL/Hr) IV Continuous <Continuous>  dextrose 5%. 1000 milliLiter(s) (100 mL/Hr) IV Continuous <Continuous>  dextrose 50% Injectable 25 Gram(s) IV Push once  dextrose 50% Injectable 12.5 Gram(s) IV Push once  dextrose 50% Injectable 25 Gram(s) IV Push once  glucagon  Injectable 1 milliGRAM(s) IntraMuscular once  heparin   Injectable 5000 Unit(s) SubCutaneous every 8 hours  HYDROmorphone PCA (1 mG/mL) 30 milliLiter(s) PCA Continuous PCA Continuous  insulin lispro (ADMELOG) corrective regimen sliding scale   SubCutaneous every 6 hours  lactated ringers. 1000 milliLiter(s) (125 mL/Hr) IV Continuous <Continuous>  pantoprazole  Injectable 40 milliGRAM(s) IV Push every 12 hours  sodium chloride 0.9% lock flush 3 milliLiter(s) IV Push every 8 hours    MEDICATIONS  (PRN):  dextrose Oral Gel 15 Gram(s) Oral once PRN Blood Glucose LESS THAN 70 milliGRAM(s)/deciliter  HYDROmorphone PCA (1 mG/mL) Rescue Clinician Bolus 0.5 milliGRAM(s) IV Push every 15 minutes PRN for Pain Scale GREATER THAN 6  naloxone Injectable 0.1 milliGRAM(s) IV Push every 3 minutes PRN For ANY of the following changes in patient status:  A. RR LESS THAN 10 breaths per minute, B. Oxygen saturation LESS THAN 90%, C. Sedation score of 6  ondansetron Injectable 4 milliGRAM(s) IV Push every 6 hours PRN Nausea      OBJECTIVE:   [X] No new signs     [ ] Other:    Side Effects:  [X ] None			[ ] Other:    Assessment of Catheter Site:		[ ] Intact		[ ] Other:    ASSESSMENT/PLAN  [ X] Continue current therapy    [ ] Therapy changed to:    [ ] IV PCA       [ ] Epidural     [ ] prn Analgesics     Comments:    Note entered after patient seen

## 2023-02-10 NOTE — DISCHARGE NOTE PROVIDER - NSDCFUADDAPPT_GEN_ALL_CORE_FT
See Dr Carver in 2 weeks- call for an appointment and bring a new chest X-ray with you when you come.

## 2023-02-10 NOTE — DISCHARGE NOTE PROVIDER - NSDCFUADDINST_GEN_ALL_CORE_FT
You can remove the outer dressings from your wounds but leave the tapes in place- they will come off in the shower.  Watch for increasing redness, fever, pus, pain and if noted, call Dr Carver.

## 2023-02-10 NOTE — DISCHARGE NOTE NURSING/CASE MANAGEMENT/SOCIAL WORK - PATIENT PORTAL LINK FT
You can access the FollowMyHealth Patient Portal offered by [facility] by registering at the following website: [email or web address]. By joining [facility] network’s FollowMyHealth portal, you will also be able to view your health information using other applications (apps) compatible with our system.

## 2023-02-10 NOTE — DISCHARGE NOTE PROVIDER - CARE PROVIDER_API CALL
Jake Carver (MD)  Surgery; Thoracic Surgery  [phone] [place] [address] [place] [address]  Phone: [phone]  Fax: [phone]  Established Patient  Follow Up Time: 2 weeks

## 2023-02-10 NOTE — DISCHARGE NOTE PROVIDER - NSDCCPCAREPLAN_GEN_ALL_CORE_FT
PRINCIPAL DISCHARGE DIAGNOSIS  Diagnosis: Gastrointestinal stromal tumor  Assessment and Plan of Treatment:

## 2023-02-10 NOTE — PROGRESS NOTE ADULT - SUBJECTIVE AND OBJECTIVE BOX
Anesthesia Pain Management Service    SUBJECTIVE: Patient is doing well with IV PCA and no significant problems reported.    Pain Scale Score	At rest: ___ 	With Activity: ___ 	[X ] Refer to charted pain scores    THERAPY:    [ ] IV PCA Morphine		[ ] 5 mg/mL	[ ] 1 mg/mL  [X ] IV PCA Hydromorphone	[ ] 5 mg/mL	[X ] 1 mg/mL  [ ] IV PCA Fentanyl		[ ] 50 micrograms/mL    Demand dose __0.2_ lockout __6_ (minutes) Continuous Rate _0__ Total: _2__  mg used (in past 24 hours)      MEDICATIONS  (STANDING):  acetaminophen   IVPB .. 1000 milliGRAM(s) IV Intermittent once  dextrose 5%. 1000 milliLiter(s) (50 mL/Hr) IV Continuous <Continuous>  dextrose 5%. 1000 milliLiter(s) (100 mL/Hr) IV Continuous <Continuous>  dextrose 50% Injectable 25 Gram(s) IV Push once  dextrose 50% Injectable 12.5 Gram(s) IV Push once  dextrose 50% Injectable 25 Gram(s) IV Push once  glucagon  Injectable 1 milliGRAM(s) IntraMuscular once  heparin   Injectable 5000 Unit(s) SubCutaneous every 8 hours  HYDROmorphone PCA (1 mG/mL) 30 milliLiter(s) PCA Continuous PCA Continuous  insulin lispro (ADMELOG) corrective regimen sliding scale   SubCutaneous every 6 hours  lactated ringers. 1000 milliLiter(s) (125 mL/Hr) IV Continuous <Continuous>  pantoprazole  Injectable 40 milliGRAM(s) IV Push every 12 hours  sodium chloride 0.9% lock flush 3 milliLiter(s) IV Push every 8 hours    MEDICATIONS  (PRN):  dextrose Oral Gel 15 Gram(s) Oral once PRN Blood Glucose LESS THAN 70 milliGRAM(s)/deciliter  HYDROmorphone PCA (1 mG/mL) Rescue Clinician Bolus 0.5 milliGRAM(s) IV Push every 15 minutes PRN for Pain Scale GREATER THAN 6  naloxone Injectable 0.1 milliGRAM(s) IV Push every 3 minutes PRN For ANY of the following changes in patient status:  A. RR LESS THAN 10 breaths per minute, B. Oxygen saturation LESS THAN 90%, C. Sedation score of 6  ondansetron Injectable 4 milliGRAM(s) IV Push every 6 hours PRN Nausea      OBJECTIVE: Patient sitting up in bed.    Sedation Score:	[ X] Alert	[ ] Drowsy 	[ ] Arousable	[ ] Asleep	[ ] Unresponsive    Side Effects:	[X ] None	[ ] Nausea	[ ] Vomiting	[ ] Pruritus  		[ ] Other:    Vital Signs Last 24 Hrs  T(C): 37.3 (10 Feb 2023 05:19), Max: 37.5 (09 Feb 2023 20:00)  T(F): 99.2 (10 Feb 2023 05:19), Max: 99.5 (09 Feb 2023 20:00)  HR: 64 (10 Feb 2023 05:19) (57 - 64)  BP: 139/80 (10 Feb 2023 05:19) (131/79 - 157/93)  BP(mean): 109 (09 Feb 2023 20:00) (92 - 109)  RR: 18 (10 Feb 2023 05:19) (14 - 20)  SpO2: 96% (10 Feb 2023 05:19) (95% - 99%)    Parameters below as of 10 Feb 2023 05:19  Patient On (Oxygen Delivery Method): room air        ASSESSMENT/ PLAN    Therapy to  be:	[ X] Continue   [ ] Discontinued   [ ] Change to prn Analgesics    Documentation and Verification of current medications:   [X] Done	[ ] Not done, not elligible    Comments: Continue IV PCA. Recommend non-opioid adjuvant analgesics to be used when possible and when allowed by primary surgical team.    Progress Note written now but Patient was seen earlier.

## 2023-02-10 NOTE — DISCHARGE NOTE PROVIDER - INSTRUCTIONS
Diet: Diet: clear liquid diet today. Advance to a full liquid diet for 2 days, starting tomorrow. Then advance to a soft diet until you see Dr. Carver in the office.

## 2023-02-15 NOTE — DATA REVIEWED
[FreeTextEntry1] : EUS on 12/29/22\par - Endophytic 4 layer gastric mass at the proximal fundus/posterior wall, 4.3x 2.9 cm in maximal dimension. No lymphadenopathy or vasular involvement.\par - Pathology: gastrointestinal stroma tumor (GIST). \par \par CTAP on 12/24/22\par - calcified 5mm granuloma RLL. No pleural effusion. \par - no CT evidence for active extravasation of intravascular contrast. There is a rounded density identified arising from the posterior gastric wall adjacent to the GE junction measuring 3.5 cm suspicious for gastric mass.

## 2023-02-15 NOTE — HISTORY OF PRESENT ILLNESS
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 JOSE JASSO  for surgical evaluation of biopsy proven gastrointestinal stroma tumor. \par \par Pt had episodes of melena and admitted to [facility] 12/23/22-12/25/22 for GI bleed. s/p 1 uPRBC transfusion.  Inpatient EGD revealed old blood and retained food in stomach.  CTAP revealed rounded density at GE junction measuring 3.5 cm, concerning for a gastric mass. He had an outpatient EGD/EUS/FNA with Dr. Margot Addison on 12/29/22, which revealed a 4.3 x 2.9 cm gastrointestinal stromal tumor. \par \par Patient presents for surgical consult with Dr. Carver. He appears generally well, NAD. He reports that prior to GI bleed he was taking aspirin 81mg daily. \par \par EUS on 12/29/22\par - Endophytic 4 layer gastric mass at the proximal fundus/posterior wall, 4.3x 2.9 cm in maximal dimension. No lymphadenopathy or vasular involvement.\par - Pathology: gastrointestinal stroma tumor (GIST). \par \par CTAP on 12/24/22\par - calcified 5mm granuloma RLL. No pleural effusion. \par - no CT evidence for active extravasation of intravascular contrast. There is a rounded density identified arising from the posterior gastric wall adjacent to the GE junction measuring 3.5 cm suspicious for gastric mass.

## 2023-02-15 NOTE — END OF VISIT
[Time Spent: ___ minutes] : I have spent [unfilled] minutes of time on the encounter. [FreeTextEntry3] : I, GEE EMMANUEL , am scribing for and in the presence of JOSE FELDMAN the following sections: History of present illness, past Medical/family/surgical/family/social history, review of systems, vital signs, physical exam and disposition.\par

## 2023-02-15 NOTE — ASSESSMENT
[FreeTextEntry1] : 47 year old male, nonsmoker, with PMHx of T2DM, HLD and recent GI bleed presents with biopsy proven gastrointestinal stroma tumor. Dr. Carver reviewed the images and reports with the patient. Dr. Carver feels the patient will benefit and is a candidate for laparoscopic. EGD, resection of gastric tumor with possible esophagogastrectomy. Dr. Carver with the patient the risks and benefits. All questions addressed, patient agrees to proceed with surgery. \par \par Plan: \par PET CT scan\par PST\par covid test\par medical clearance\par OR on 2/8/23 @ [facility](patients preference). \par \par

## 2023-02-22 PROBLEM — C49.A0 GASTROINTESTINAL STROMAL TUMOR (GIST): Status: ACTIVE | Noted: 2023-01-05

## 2023-02-22 PROBLEM — Z09 POSTOP CHECK: Status: ACTIVE | Noted: 2023-02-22

## 2023-02-22 RX ORDER — FERROUS SULFATE 325(65) MG
324 TABLET ORAL
Refills: 0 | Status: ACTIVE | COMMUNITY

## 2023-02-23 LAB — SURGICAL PATHOLOGY STUDY: SIGNIFICANT CHANGE UP

## 2023-02-24 ENCOUNTER — APPOINTMENT (OUTPATIENT)
Dept: THORACIC SURGERY | Facility: CLINIC | Age: 48
End: 2023-02-24
Payer: COMMERCIAL

## 2023-02-24 VITALS
HEART RATE: 80 BPM | DIASTOLIC BLOOD PRESSURE: 69 MMHG | RESPIRATION RATE: 18 BRPM | BODY MASS INDEX: 30.49 KG/M2 | TEMPERATURE: 98 F | OXYGEN SATURATION: 97 % | WEIGHT: 213 LBS | SYSTOLIC BLOOD PRESSURE: 142 MMHG | HEIGHT: 70 IN

## 2023-02-24 DIAGNOSIS — Z87.19 PERSONAL HISTORY OF OTHER DISEASES OF THE DIGESTIVE SYSTEM: ICD-10-CM

## 2023-02-24 DIAGNOSIS — C49.A0 GASTROINTESTINAL STOMACL TUMOR,UNSPECIFIED SITE: ICD-10-CM

## 2023-02-24 DIAGNOSIS — R13.19 OTHER DYSPHAGIA: ICD-10-CM

## 2023-02-24 DIAGNOSIS — Z09 ENCOUNTER FOR FOLLOW-UP EXAMINATION AFTER COMPLETED TREATMENT FOR CONDITIONS OTHER THAN MALIGNANT NEOPLASM: ICD-10-CM

## 2023-02-24 PROCEDURE — 99024 POSTOP FOLLOW-UP VISIT: CPT

## 2023-02-27 PROBLEM — Z87.19 HISTORY OF DYSPHAGIA: Status: RESOLVED | Noted: 2023-02-27 | Resolved: 2023-02-27

## 2023-02-27 PROBLEM — R13.19 ESOPHAGEAL DYSPHAGIA: Status: ACTIVE | Noted: 2023-02-27

## 2023-02-27 RX ORDER — PEG-3350, SODIUM SULFATE, SODIUM CHLORIDE, POTASSIUM CHLORIDE, SODIUM ASCORBATE AND ASCORBIC ACID 7.5-2.691G
100 KIT ORAL
Qty: 1 | Refills: 0 | Status: DISCONTINUED | COMMUNITY
Start: 2023-01-23 | End: 2023-02-27

## 2023-02-27 NOTE — REASON FOR VISIT
[de-identified] : Esophagogastroduodenoscopy, excision of gastric GIST tumor from the fundus  and esophagogastric junction with primary repair.  Primary repair of umbilical hernia.\par  [de-identified] : 02/08/2023 [de-identified] : 10 [de-identified] : 3 [de-identified] : \par Pt had surgery in [facility]. Postop hospitalization uneventful. Discharged on 02/10/2023 after a barium swallow and tolerating a clear liquid diet.\par \par Surgical pathology: Stage II, pT3N0 Gastrointestinal Stroma Tumor, Spindle cell type. Tumor Size 5.2cm, all margins negative.\par \par Patient presents today for the 1st postoperative visit. Patient reports chocking sensation when eating rice or bread. He otherwise tolerates a regular diet, including meats. He reports scant discharge umbilical area. There's no fever, chills, epigastric pain, nausea, or vomiting. \par \par \par

## 2023-02-27 NOTE — ASSESSMENT
[FreeTextEntry1] : 47-year-old male, never smoker, practicing podiatrist, with PMHx of T2DM, HLD, who was referred by Dr. Jake Weaver for a biopsy-proven, 4.2cm gastrointestinal stromal tumor in the stomach fundus.  \par \par On 02/08/2023, patient underwent Esophagogastroduodenoscopy, excision of gastric GIST tumor from the fundus  and esophagogastric junction with primary repair.  Primary repair of umbilical hernia.\par \par Postop hospitalization uneventful. Discharged on 02/10/2023 after a barium swallow and tolerating a clear liquid diet. Patient presents today for the 1st postoperative follow up. \par \par Surgical pathology was reviewed: Stage II, pT3N0 Gastrointestinal Stroma Tumor, Spindle cell type. all margins negative. I suggested the patient to be evaluated by Dr. Trevino. \par \par Patient has chocking sensation when eating rice or bread, most likely related to the EG junction swelling after surgery. Symptoms should improve slowly. Can evaluate with an esophagram if persist. The umbilical granulation is clean, no signs of infection. Overall, patient is doing well, will follow up in 2 months for stability and evaluation of dysphagia. \par \par Plan: RTO in 2 months. \par \par Gail SCHWARTZ FNP, am scribing for and the presence of Dr. JAKE FELDMAN the following sections: history of present illness, past medical/family/surgical/family/social history, review of systems, vital signs, physical exam, and disposition.\par \par JAKE SCHWARTZ, personally performed the services described in the documentation, reviewed the documentation recorded by the scribe in my presence and it accurately and completely records my words and actions.\par \par

## 2023-02-27 NOTE — PHYSICAL EXAM
[] : no respiratory distress [Auscultation Breath Sounds / Voice Sounds] : lungs were clear to auscultation bilaterally [FreeTextEntry1] : pink fresh wet granulation tissue notice umbilicus incision, clean. Rest abdominal incisions healed well.

## 2023-05-02 NOTE — PRE-OP CHECKLIST - BOWEL PREP
Information to Prepare you for your Surgery    PRE-ADMIT TESTING -  [phone]    [address] [facility]          Your surgery has been scheduled at [facility]. We are pleased to have the opportunity to serve you. For Further Information please call [phone].    On the day of surgery please report to the Information Desk on the 1st floor.    CONTACT YOUR PHYSICIAN'S OFFICE THE DAY PRIOR TO YOUR SURGERY TO OBTAIN YOUR ARRIVAL TIME.     The evening before surgery do not eat anything after 9 p.m. ( this includes hard candy, chewing gum and mints).  You may only have GATORADE, POWERADE AND WATER  from 9 p.m. until you leave your home.   DO NOT DRINK ANY LIQUIDS ON THE WAY TO THE HOSPITAL.      Why does your anesthesiologist allow you to drink Gatorade/Powerade before surgery?  Gatorade/Powerade helps to increase your comfort before surgery and to decrease your nausea after surgery. The carbohydrates in Gatorade/Powerade help reduce your body's stress response to surgery.  If you are a diabetic-drink only water prior to surgery.       Patients may have 2 visitors pre and post procedure. Only 2 visitors will be allowed in the Surgical building with the patient. No one under the age of 12 will be allowed into the facility.    SPECIAL MEDICATION INSTRUCTIONS: TAKE medications checked off by the Anesthesiologist on your Medication List.    Angiogram Patients: Take medications as instructed by your physician, including aspirin.     Surgery Patients:    If you take ASPIRIN - Your PHYSICIAN/SURGEON will need to inform you IF/OR when you need to stop taking aspirin prior to your surgery.     The week prior to surgery do not ot take any medications containing IBUPROFEN or NSAIDS ( Advil, Motrin, Goodys, BC, Aleve, Naproxen etc) If you are not sure if you should take a medicine please call your surgeon's office.  Ok to take Tylenol    Do Not Wear any make-up  (especially eye make-up) to surgery. Please remove any false eyelashes or eyelash extensions. If you arrive the day of surgery with makeup/eyelashes on you will be required to remove prior to surgery. (There is a risk of corneal abrasions if eye makeup/eyelash extensions are not removed)      Leave all valuables at home.   Do Not wear any jewelry or watches, including any metal in body piercings. Jewelry must be removed prior to coming to the hospital.  There is a possibility that rings that are unable to be removed may be cut off if they are on the surgical extremity.    Please remove all hair extensions, wigs, clips and any other metal accessories/ ornaments from your hair.  These items may pose a flammable/fire risk in Surgery and must be removed.    Do not shave your surgical area at least 5 days prior to your surgery. The surgical prep will be performed at the hospital according to Infection Control regulations.    Contact Lens must be removed before surgery. Either do not wear the contact lens or bring a case and solution for storage.  Please bring a container for eyeglasses or dentures as required.  Bring any paperwork your physician has provided, such as consent forms,  history and physicals, doctor's orders, etc.   Bring comfortable clothes that are loose fitting to wear upon discharge. Take into consideration the type of surgery being performed.  Maintain your diet as advised per your physician the day prior to surgery.      Adequate rest the night before surgery is advised.   Park in the Parking lot behind the hospital or in the [facility] across the street from the parking lot. Parking is complimentary.  If you will be discharged the same day as your procedure, please arrange for a responsible adult to drive you home or to accompany you if traveling by taxi.   YOU WILL NOT BE PERMITTED TO DRIVE OR TO LEAVE THE HOSPITAL ALONE AFTER SURGERY.   If you are being discharged the same day, it is  strongly recommended that you arrange for someone to remain with you for the first 24 hrs following your surgery.    The Surgeon will speak to your family/visitor after your surgery regarding the outcome of your surgery and post op care.  The Surgeon may speak to you after your surgery, but there is a possibility you may not remember the details.  Please check with your family members regarding the conversation with the Surgeon.    We strongly recommend whoever is bringing you home be present for discharge instructions.  This will ensure a thorough understanding for your post op home care.    ALL CHILDREN MUST ALWAYS BE ACCOMPANIED BY AN ADULT.    Visitors-Refer to current Visitor policy handouts.    Thank you for your cooperation.  The Staff of [facility].            Bathing Instructions with Hibiclens    Shower the evening before and morning of your procedure with Chlorhexidine (Hibiclens)  do not use Chlorhexidine on your face or genitals. Do not get in your eyes.  Wash your face with water and your regular face wash/soap  Use your regular shampoo  Apply Chlorhexidine (Hibiclens) directly on your skin or on a wet washcloth and wash gently. When showering: Move away from the shower stream when applying Chlorhexidine (Hibiclens) to avoid rinsing off too soon.  Rinse thoroughly with warm water  Do not dilute Chlorhexidine (Hibiclens)   Dry off as usual, do not use any deodorant, powder, body lotions, perfume, after shave or cologne.                  n/a

## 2023-05-05 ENCOUNTER — APPOINTMENT (OUTPATIENT)
Dept: THORACIC SURGERY | Facility: CLINIC | Age: 48
End: 2023-05-05

## 2023-05-17 NOTE — HISTORY OF PRESENT ILLNESS
[FreeTextEntry1] : 47-year-old male, never smoker, practicing podiatrist, with PMHx of T2DM, HLD, who was referred by Dr. Jake Weaver for a biopsy-proven, 4.2cm gastrointestinal stromal tumor in the stomach fundus. \par \par On 02/08/2023, patient underwent Esophagogastroduodenoscopy, excision of gastric GIST tumor from the fundus and esophagogastric junction with primary repair. Primary repair of umbilical hernia.\par \par Surgical pathology: Stage II, pT3N0 Gastrointestinal Stroma Tumor, Spindle cell type. Tumor Size 5.2cm, all margins negative.\par \par Patient was referred to Dr. Trevino for further evaluation. At his last visit, patient admitted chocking sensation when eating rice and bread, he presents today for a follow up visit to reassess his symptoms. \par \par \par \par \par \par

## 2023-05-17 NOTE — CONSULT LETTER
[FreeTextEntry3] : Jake Carver MD\par Professor, Cardiovascular & Thoracic Surgery\par [employer]\par Director of the [facility] \par Director of Thoracic Surgery, [facility]\par \par [facility]\par [address]\par [facility] 4th Floor\par [place] [address]\par Phone: [phone]\par Fax: [phone]\par

## 2023-05-19 ENCOUNTER — APPOINTMENT (OUTPATIENT)
Dept: THORACIC SURGERY | Facility: CLINIC | Age: 48
End: 2023-05-19

## 2023-08-21 NOTE — ASU PREOP CHECKLIST - NSWEIGHTCALCTOOLDRUG_GEN_A_CORE
used [facility]:  I discussed with the patient the risks of topical clascoterone including but not limited to erythema, scaling, itching, and stinging. Patient voiced their understanding.

## (undated) DEVICE — XI ARM PERMANENT CAUTERY HOOK

## (undated) DEVICE — NDL COUNTER FOAM AND MAGNET 40-70

## (undated) DEVICE — PACK ADVANCED LAPAROSCOPIC NS

## (undated) DEVICE — CATH IV SAFE BC 20G X 1.16" (PINK)

## (undated) DEVICE — ELCTR BOVIE PENCIL HANDPIECE

## (undated) DEVICE — BLADE SCALPEL SAFETYLOCK #10

## (undated) DEVICE — PACK ROBOTIC LIJ

## (undated) DEVICE — TROCAR COVIDIEN BLUNT TIP HASSAN 10MM

## (undated) DEVICE — SOL INJ NS 0.9% 500ML 2 PORT

## (undated) DEVICE — SHEARS COVIDIEN ENDO SHEAR 5MM X 31CM W UNIPOLAR CAUTERY

## (undated) DEVICE — XI OBTURATOR OPTICAL BLADELESS 8MM

## (undated) DEVICE — SUT POLYSORB 2-0 30" V-20 UNDYED

## (undated) DEVICE — STAPLER COVIDIEN ENDO GIA STANDARD HANDLE

## (undated) DEVICE — TUBING STRYKEFLOW II SUCTION / IRRIGATOR

## (undated) DEVICE — TUBING INSUFFLATION LAP FILTER 10FT

## (undated) DEVICE — BLADE SCALPEL SAFETYLOCK #15

## (undated) DEVICE — XI DRAPE ARM

## (undated) DEVICE — POSITIONER PURPLE ARM ONE STEP (LARGE)

## (undated) DEVICE — TROCAR COVIDIEN VERSASTEP PLUS 15MM STANDARD

## (undated) DEVICE — XI ARM GRASPER TIP UP FENESTRATED

## (undated) DEVICE — XI VESSEL SEALER

## (undated) DEVICE — XI DRAPE COLUMN

## (undated) DEVICE — GLV 8 PROTEXIS (WHITE)

## (undated) DEVICE — SOL IRR BAG H2O 1000ML

## (undated) DEVICE — GLV 7.5 PROTEXIS (WHITE)

## (undated) DEVICE — WARMING BLANKET LOWER ADULT

## (undated) DEVICE — CANISTER SUCTION 2000CC

## (undated) DEVICE — SPECIMEN CONTAINER 100ML

## (undated) DEVICE — SUT VLOC 180 3-0 12" V-20 GREEN

## (undated) DEVICE — SUT POLYSORB 0 60" TIES UNDYED

## (undated) DEVICE — XI ARM FORCEP CADIERE 8MM

## (undated) DEVICE — SOL IRR POUR NS 0.9% 500ML

## (undated) DEVICE — XI NEEDLE DRIVER LARGE

## (undated) DEVICE — DRSG OPSITE 13.75 X 4"

## (undated) DEVICE — ELCTR BOVIE TIP BLADE INSULATED 2.75" EDGE

## (undated) DEVICE — POSITIONER FOAM EGG CRATE ULNAR 2PCS (PINK)

## (undated) DEVICE — TUBING IV SET MICROCLAVE ADAPTER

## (undated) DEVICE — XI ARM GRASPER BIPOLAR LONG 8MM

## (undated) DEVICE — FOLEY TRAY 16FR 5CC LF LUBRISIL ADVANCE TEMP CLOSED

## (undated) DEVICE — XI ARM CLIP APPLIER LARGE

## (undated) DEVICE — SYR ALLIANCE II INFLATION 60ML

## (undated) DEVICE — TROCAR COVIDIEN VERSAPORT BLADELESS OPTICAL 5MM STANDARD

## (undated) DEVICE — DRAPE IOBAN 23" X 23"

## (undated) DEVICE — XI SEAL UNIV 5- 8 MM

## (undated) DEVICE — DRAIN PENROSE .5" X 18" LATEX

## (undated) DEVICE — VENODYNE/SCD SLEEVE CALF MEDIUM

## (undated) DEVICE — DRAPE 3/4 SHEET 52X76"

## (undated) DEVICE — TROCAR GELPOINT MINI ADVANCED

## (undated) DEVICE — DRAPE MAYO STAND 30"

## (undated) DEVICE — SUCTION YANKAUER NO CONTROL VENT

## (undated) DEVICE — ELCTR BOVIE TIP BLADE INSULATED 6.5" EDGE

## (undated) DEVICE — VISITEC 4X4

## (undated) DEVICE — D HELP - CLEARVIEW CLEARIFY SYSTEM

## (undated) DEVICE — XI ARM DISSECTOR CURVED BIPOLAR 8MM

## (undated) DEVICE — TUBING SUCTION 20FT

## (undated) DEVICE — TUBING SUCTION CONN 6FT STERILE

## (undated) DEVICE — ENDOCATCH 10MM SPECIMEN POUCH

## (undated) DEVICE — MARKING PEN W RULER

## (undated) DEVICE — ENDOCATCH II 15MM

## (undated) DEVICE — DRAPE GENERAL ENDOSCOPY

## (undated) DEVICE — TROCAR SURGIQUEST AIRSEAL 12MMX100MM

## (undated) DEVICE — FOLEY HOLDER STATLOCK 2 WAY ADULT

## (undated) DEVICE — LAP PAD 18 X 18"

## (undated) DEVICE — SOL IRR POUR H2O 250ML

## (undated) DEVICE — XI ARM FORCEP TENACULUM

## (undated) DEVICE — XI ARM PERMANENT CAUTERY SPATULA

## (undated) DEVICE — XI ARM SCISSOR MONO CURVED

## (undated) DEVICE — TUBING IV SET GRAVITY 3Y 100" MACRO

## (undated) DEVICE — GLV 6.5 PROTEXIS (WHITE)

## (undated) DEVICE — BASIN SET SINGLE

## (undated) DEVICE — TUBING AIRSEAL TRI-LUMEN FILTERED

## (undated) DEVICE — XI ARM FORCEP PROGRASP 8MM

## (undated) DEVICE — SUT SOFSILK 2-0 18" C-23

## (undated) DEVICE — BALLOON US ENDO

## (undated) DEVICE — DRAPE INSTRUMENT POUCH 6.75" X 11"

## (undated) DEVICE — ENDOCATCH GENERAL 10MM (PURPLE)

## (undated) DEVICE — DRAPE MAYO STAND 23"

## (undated) DEVICE — URETERAL CATH RED RUBBER 14FR (GREEN)

## (undated) DEVICE — XI ARM NEEDLE DRIVER SUTURECUT MEGA 8MM

## (undated) DEVICE — VALVE YELLOW PORT SEAL PLUS 5MM

## (undated) DEVICE — XI ARM FORCEP FENESTRATED BIPOLAR 8MM

## (undated) DEVICE — WARMING BLANKET UPPER ADULT

## (undated) DEVICE — MEDICATION LABELS W MARKER

## (undated) DEVICE — SENSOR O2 FINGER ADULT

## (undated) DEVICE — DRAIN RESERVOIR FOR JACKSON PRATT 100CC CARDINAL

## (undated) DEVICE — SCOPE WARMER SEAL DISP

## (undated) DEVICE — SUT POLYSORB 4-0 P-12 UNDYED

## (undated) DEVICE — NDL ACQUIRE EUS FNB 22G

## (undated) DEVICE — KIT ENDO PROCEDURE CUST W/VLV

## (undated) DEVICE — SUT SOFSILK 0 30" V-20

## (undated) DEVICE — SUT SOFSILK 3-0 30" V-20

## (undated) DEVICE — GLV 7 PROTEXIS (WHITE)

## (undated) DEVICE — BITE BLOCK ADULT 20 X 27MM (GREEN)

## (undated) DEVICE — PACK BASIN SPECIAL PROCEDURE

## (undated) DEVICE — DRSG STERISTRIPS 0.5 X 4"

## (undated) DEVICE — DRAPE BACK TABLE COVER HEAVY DUTY 60"

## (undated) DEVICE — XI ARM FORCEP MARYLAND BIPOLAR

## (undated) DEVICE — POSITIONER PINK PAD PIGAZZI SYSTEM

## (undated) DEVICE — BIOPSY FORCEP RADIAL JAW 4 STANDARD WITH NEEDLE

## (undated) DEVICE — XI ARM CLIP APPLIER MEDIUM-LARGE

## (undated) DEVICE — SUT SOFSILK 2-0 30" V-20

## (undated) DEVICE — SUT POLYSORB 0 36" GU-46

## (undated) DEVICE — PREP CHLORAPREP HI-LITE ORANGE 26ML

## (undated) DEVICE — PACK IV START WITH CHG

## (undated) DEVICE — TUBING CAP SET ERBEFLO CLEVERCAP HYBRID CO2 FOR OLYMPUS SCOPES AND UCR

## (undated) DEVICE — CATH IV SAFE BC 22G X 1" (BLUE)

## (undated) DEVICE — CHEST DRAIN PLEUR-EVAC WET/WET ADULT-PEDS SINGLE (QUICK)

## (undated) DEVICE — DRSG GAUZE PACKTNER ROLL